# Patient Record
Sex: FEMALE | Race: BLACK OR AFRICAN AMERICAN | NOT HISPANIC OR LATINO | Employment: PART TIME | URBAN - METROPOLITAN AREA
[De-identification: names, ages, dates, MRNs, and addresses within clinical notes are randomized per-mention and may not be internally consistent; named-entity substitution may affect disease eponyms.]

---

## 2023-03-14 ENCOUNTER — TELEPHONE (OUTPATIENT)
Dept: ORTHOPEDICS | Facility: CLINIC | Age: 63
End: 2023-03-14
Payer: MEDICAID

## 2023-03-14 NOTE — TELEPHONE ENCOUNTER
----- Message from Mercy Jacobs MA sent at 3/14/2023 11:02 AM CDT -----  Type:  Sooner Apoointment Request    Caller is requesting a sooner appointment. yes   Name of Caller: pt  When is the first available appointment? yes  Would the patient rather a call back or a response via [facility]? Call back  Best Call Back Number: [phone]  Additional Information:  please contact pt to be scheduled by dept from active referral, pt requesting [place] location    NO ANSWER  RECORDER SAID THE WIRELESS PERSON IS NOT AVAILABLE  PLEASE CALL AGAIN LATER

## 2023-06-05 DIAGNOSIS — M25.561 PAIN IN BOTH KNEES, UNSPECIFIED CHRONICITY: Primary | ICD-10-CM

## 2023-06-05 DIAGNOSIS — M25.562 PAIN IN BOTH KNEES, UNSPECIFIED CHRONICITY: Primary | ICD-10-CM

## 2023-06-05 NOTE — PROGRESS NOTES
"Subjective:      Patient ID: Enmanuel Dickson is a 62 y.o. female.    Chief Complaint: Pain of the Right Knee (Patient presents today as a new patient stating she has been having pain in her knees for about 2 years now. )      HPI  ([facility])    History of right TKA 2022 by Dr. Snyder. 8-9 years ago she had IM cinthia of right tibia for fracture. She has no pain in right knee. He no longer takes her insurance (medicaid).     She has intermittent pain in left knee that is worse with standing and walking. She works in chemical plant. She rates her pain as a 0 on a scale of 1-10. She has intermittent swelling in left knee with some giving way.     She takes mobic and prn aspercream.     No relief with previous steroid injections in left knee. Had good relief with previous gel injections.     She is not ready for left TKA.       History reviewed. No pertinent past medical history.      Current Outpatient Medications:     acetaminophen (TYLENOL) 500 MG tablet, Take 2 tablets by mouth every 8 (eight) hours as needed., Disp: , Rfl:     biotin 1 mg tablet, Take 1,000 mcg by mouth., Disp: , Rfl:     gabapentin (NEURONTIN) 300 MG capsule, Take 300 mg by mouth 3 (three) times daily., Disp: , Rfl:     losartan-hydrochlorothiazide 50-12.5 mg (HYZAAR) 50-12.5 mg per tablet, Take 1 tablet by mouth every morning., Disp: , Rfl:     meloxicam (MOBIC) 15 MG tablet, Take 15 mg by mouth., Disp: , Rfl:     metoprolol succinate (TOPROL-XL) 50 MG 24 hr tablet, Take 50 mg by mouth., Disp: , Rfl:     Review of patient's allergies indicates:  No Known Allergies    Review of Systems   Constitutional: Negative for chills, fever, night sweats and weight gain.   Gastrointestinal:  Negative for bowel incontinence, nausea and vomiting.   Genitourinary:  Negative for bladder incontinence.   Neurological:  Negative for disturbances in coordination and loss of balance.         Objective:        Ht 5' 4" (1.626 m)   Wt 62.9 kg (138 lb 9.6 oz)   BMI 23.79 " kg/m²     General    Vitals reviewed.  Constitutional: She is oriented to person, place, and time. She appears well-developed and well-nourished.   Pulmonary/Chest: Effort normal.   Abdominal: She exhibits no distension.   Neurological: She is alert and oriented to person, place, and time.   Psychiatric: She has a normal mood and affect. Her behavior is normal. Judgment and thought content normal.         Body habitus is normal.   The patient walks without a limp.      RIGHT KNEE EXAM:  Incisions are well healed.   No tenderness noted.   Range of motion- Flexion 110 deg, Extension 0 deg,   No gross instability.     LE is NVI distally with supple calf.     LEFT KNEE EXAM:  Resisted SLR negative.   The skin over the knee is intact.  Knee effusion mild   Tendernes is located medial and lateral, varus deformity noted.   Range of motion- Flexion 110 deg, Extension 0 deg,     Ligament exam:   MCL 1+ laxity   Lachman intact              Post sag intact    LCL intact    Patellar apprehension negative.  Popliteal cyst negative  Patellar crepitation present.    Motor normal 5/5 strength in all tested muscle groups.   Sensory normal.      XRAY INTERPRETATION:  X-rays of bilateral knees dated 6/14/23 are personally reviewed and show severe tricompartmental arthritis in left knee with varus deformity. Right total knee prosthesis with IM cinthia right tibia.     Above XRs reviewed with Dr. العلي.         Assessment:       Encounter Diagnoses   Name Primary?    Primary osteoarthritis of left knee Yes    History of total right knee replacement           Plan:       Enmanuel was seen today for pain.    Diagnoses and all orders for this visit:    Primary osteoarthritis of left knee  -     Ambulatory referral/consult to Orthopedics    History of total right knee replacement      History of right TKA 2022 by Dr. Snyder. 8-9 years ago she had IM cinthia of right tibia for fracture. She has no pain in right knee.     She has intermittent pain  in left knee that is worse with standing and walking. She works in chemical plant. She has intermittent swelling in left knee with some giving way.     XRs of both knees show severe tricompartmental arthritis in left knee with varus deformity. Right total knee prosthesis with IM cinthia right tibia.    Treatment options reviewed with patient along with above bilateral knee xrays. Following plan made:     - Continue on mobic. Reviewed dosing and side effects. Take with food.   - Given hinged knee brace. Will wear this prn comfort/support with prolonged walking.   - No relief with previous steroid injections. She declines injection today.   - Good relief with previous gel injections. Will order euflexxa injection for right knee. 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 No previous improvement with medications, time, or steroid injections. Euflexxa orders done for injection to be after 6/28/23.   - She will follow up with one of the other ortho providers for her injections since I will be gone.   - She is a candidate for right TKA when she is ready. She would like to hold off as long as she has relief with euflexxa injections.     Follow up if symptoms worsen or fail to improve.

## 2023-06-14 ENCOUNTER — OFFICE VISIT (OUTPATIENT)
Dept: ORTHOPEDICS | Facility: CLINIC | Age: 63
End: 2023-06-14
Payer: MEDICAID

## 2023-06-14 VITALS — WEIGHT: 138.63 LBS | BODY MASS INDEX: 23.67 KG/M2 | HEIGHT: 64 IN

## 2023-06-14 DIAGNOSIS — M17.12 PRIMARY OSTEOARTHRITIS OF LEFT KNEE: Primary | ICD-10-CM

## 2023-06-14 DIAGNOSIS — Z96.651 HISTORY OF TOTAL RIGHT KNEE REPLACEMENT: ICD-10-CM

## 2023-06-14 PROCEDURE — 3008F PR BODY MASS INDEX (BMI) DOCUMENTED: ICD-10-PCS | Mod: CPTII,,, | Performed by: PHYSICIAN ASSISTANT

## 2023-06-14 PROCEDURE — 99203 OFFICE O/P NEW LOW 30 MIN: CPT | Mod: S$PBB,,, | Performed by: PHYSICIAN ASSISTANT

## 2023-06-14 PROCEDURE — 99999 PR PBB SHADOW E&M-EST. PATIENT-LVL III: CPT | Mod: PBBFAC,,, | Performed by: PHYSICIAN ASSISTANT

## 2023-06-14 PROCEDURE — 99203 PR OFFICE/OUTPT VISIT, NEW, LEVL III, 30-44 MIN: ICD-10-PCS | Mod: S$PBB,,, | Performed by: PHYSICIAN ASSISTANT

## 2023-06-14 PROCEDURE — 3008F BODY MASS INDEX DOCD: CPT | Mod: CPTII,,, | Performed by: PHYSICIAN ASSISTANT

## 2023-06-14 PROCEDURE — 1160F RVW MEDS BY RX/DR IN RCRD: CPT | Mod: CPTII,,, | Performed by: PHYSICIAN ASSISTANT

## 2023-06-14 PROCEDURE — 1159F MED LIST DOCD IN RCRD: CPT | Mod: CPTII,,, | Performed by: PHYSICIAN ASSISTANT

## 2023-06-14 PROCEDURE — 1160F PR REVIEW ALL MEDS BY PRESCRIBER/CLIN PHARMACIST DOCUMENTED: ICD-10-PCS | Mod: CPTII,,, | Performed by: PHYSICIAN ASSISTANT

## 2023-06-14 PROCEDURE — 1159F PR MEDICATION LIST DOCUMENTED IN MEDICAL RECORD: ICD-10-PCS | Mod: CPTII,,, | Performed by: PHYSICIAN ASSISTANT

## 2023-06-14 PROCEDURE — 99213 OFFICE O/P EST LOW 20 MIN: CPT | Mod: PBBFAC,PN | Performed by: PHYSICIAN ASSISTANT

## 2023-06-14 PROCEDURE — 99999 PR PBB SHADOW E&M-EST. PATIENT-LVL III: ICD-10-PCS | Mod: PBBFAC,,, | Performed by: PHYSICIAN ASSISTANT

## 2023-06-14 RX ORDER — GABAPENTIN 300 MG/1
300 CAPSULE ORAL 3 TIMES DAILY
COMMUNITY
Start: 2023-05-31

## 2023-06-14 RX ORDER — MELOXICAM 15 MG/1
15 TABLET ORAL
COMMUNITY
Start: 2023-05-31

## 2023-06-14 RX ORDER — ACETAMINOPHEN 500 MG
2 TABLET ORAL EVERY 8 HOURS PRN
COMMUNITY

## 2023-06-14 RX ORDER — BIOTIN 1 MG
1000 TABLET ORAL
COMMUNITY

## 2023-06-14 RX ORDER — METOPROLOL SUCCINATE 50 MG/1
50 TABLET, EXTENDED RELEASE ORAL
COMMUNITY
Start: 2023-05-29

## 2023-06-14 RX ORDER — LOSARTAN POTASSIUM AND HYDROCHLOROTHIAZIDE 12.5; 5 MG/1; MG/1
1 TABLET ORAL EVERY MORNING
COMMUNITY
Start: 2023-05-18

## 2023-06-14 NOTE — PATIENT INSTRUCTIONS
It was nice to meet you today! I am sorry that you are hurting so much.     You have some wear and tear in your left knee (arthritis) and this is likely what is causing your pain. Right knee xrays look okay.     Wear the knee brace on left knee as needed for prolonged walking. This should give you added support and help with pain.     Continue meloxicam to help with pain/inflammation. Take as directed with food.     I will order gel injections (euflexxa) and we will work on getting approved. Will call you to schedule your appointment.     Please stay in touch and call me if you need anything. You can also send me a message in [facility].     Jacqui   [phone]

## 2023-06-23 ENCOUNTER — TELEPHONE (OUTPATIENT)
Dept: ORTHOPEDICS | Facility: CLINIC | Age: 63
End: 2023-06-23
Payer: MEDICAID

## 2023-06-23 NOTE — TELEPHONE ENCOUNTER
I don't have any availability before I leave next week.     The PAs coming in to help don't start until after the holiday.     She should keep her scheduled appointments.

## 2023-06-23 NOTE — TELEPHONE ENCOUNTER
Returned pt call. patient wanted an earlier appt with Mar but I informed her that her schedule is full her last days . She is scheduled to be seen on 7/5..

## 2023-06-23 NOTE — TELEPHONE ENCOUNTER
----- Message from Helen Hodge MA sent at 6/23/2023 10:20 AM CDT -----  Regarding: FW: sooner  Contact: [phone]    ----- Message -----  From: Catherine Escobedo  Sent: 6/23/2023   9:12 AM CDT  To: [facility] Staff  Subject: sooner                                           Type:  Sooner Apoointment Request    Caller is requesting a sooner appointment.  Caller declined first available appointment listed below.  Caller will not accept being placed on the waitlist and is requesting a message be sent to doctor.  Name of Caller: pt  When is the first available appointment? 7/5  Symptoms: knee injection  Would the patient rather a call back or a response via [facility]?  call  Best Call Back Number: [phone] or [phone]  Additional Information:

## 2023-07-05 ENCOUNTER — OFFICE VISIT (OUTPATIENT)
Dept: ORTHOPEDICS | Facility: CLINIC | Age: 63
End: 2023-07-05
Payer: MEDICAID

## 2023-07-05 VITALS — BODY MASS INDEX: 23.56 KG/M2 | HEIGHT: 64 IN | WEIGHT: 138 LBS

## 2023-07-05 DIAGNOSIS — M25.552 CHRONIC LEFT HIP PAIN: Primary | ICD-10-CM

## 2023-07-05 DIAGNOSIS — G89.29 CHRONIC LEFT HIP PAIN: Primary | ICD-10-CM

## 2023-07-05 PROCEDURE — 99999 PR PBB SHADOW E&M-EST. PATIENT-LVL III: CPT | Mod: PBBFAC,,, | Performed by: ORTHOPAEDIC SURGERY

## 2023-07-05 PROCEDURE — 99212 PR OFFICE/OUTPT VISIT, EST, LEVL II, 10-19 MIN: ICD-10-PCS | Mod: S$PBB,25,, | Performed by: ORTHOPAEDIC SURGERY

## 2023-07-05 PROCEDURE — 3008F PR BODY MASS INDEX (BMI) DOCUMENTED: ICD-10-PCS | Mod: CPTII,,, | Performed by: ORTHOPAEDIC SURGERY

## 2023-07-05 PROCEDURE — 20610 LARGE JOINT ASPIRATION/INJECTION: L KNEE: ICD-10-PCS | Mod: S$PBB,LT,, | Performed by: ORTHOPAEDIC SURGERY

## 2023-07-05 PROCEDURE — 99212 OFFICE O/P EST SF 10 MIN: CPT | Mod: S$PBB,25,, | Performed by: ORTHOPAEDIC SURGERY

## 2023-07-05 PROCEDURE — 20610 DRAIN/INJ JOINT/BURSA W/O US: CPT | Mod: S$PBB,LT,, | Performed by: ORTHOPAEDIC SURGERY

## 2023-07-05 PROCEDURE — 1159F MED LIST DOCD IN RCRD: CPT | Mod: CPTII,,, | Performed by: ORTHOPAEDIC SURGERY

## 2023-07-05 PROCEDURE — 99999 PR PBB SHADOW E&M-EST. PATIENT-LVL III: ICD-10-PCS | Mod: PBBFAC,,, | Performed by: ORTHOPAEDIC SURGERY

## 2023-07-05 PROCEDURE — 99213 OFFICE O/P EST LOW 20 MIN: CPT | Mod: PBBFAC,PN,25 | Performed by: ORTHOPAEDIC SURGERY

## 2023-07-05 PROCEDURE — 20610 DRAIN/INJ JOINT/BURSA W/O US: CPT | Mod: PBBFAC,PN | Performed by: ORTHOPAEDIC SURGERY

## 2023-07-05 PROCEDURE — 3008F BODY MASS INDEX DOCD: CPT | Mod: CPTII,,, | Performed by: ORTHOPAEDIC SURGERY

## 2023-07-05 PROCEDURE — 1159F PR MEDICATION LIST DOCUMENTED IN MEDICAL RECORD: ICD-10-PCS | Mod: CPTII,,, | Performed by: ORTHOPAEDIC SURGERY

## 2023-07-05 RX ADMIN — Medication 20 MG: at 09:07

## 2023-07-05 NOTE — PROCEDURES
Large Joint Aspiration/Injection: L knee    Date/Time: 7/5/2023 9:30 AM  Performed by: Nona Marcos PA-C  Authorized by: Nona Marcos PA-C     Indications:  Arthritis and pain  Site marked: the procedure site was marked    Timeout: prior to procedure the correct patient, procedure, and site was verified    Prep: patient was prepped and draped in usual sterile fashion      Details:  Needle Size:  22 G  Location:  Knee  Site:  L knee  Medications:  20 mg sodium hyaluronate (EUFLEXXA) 10 mg/mL(mw 2.4 -3.6 million)  Patient tolerance:  Patient tolerated the procedure well with no immediate complications

## 2023-07-06 ENCOUNTER — TELEPHONE (OUTPATIENT)
Dept: ORTHOPEDICS | Facility: CLINIC | Age: 63
End: 2023-07-06
Payer: MEDICAID

## 2023-07-06 NOTE — TELEPHONE ENCOUNTER
----- Message from Jared Nicholson sent at 7/6/2023  9:26 AM CDT -----  .Type:  Needs Medical Advice    Who Called: pt    Would the patient rather a call back or a response via [facility]? Call back  Best Call Back Number: [phone]  Additional Information:     Pt would like a call back please

## 2023-07-11 NOTE — PROGRESS NOTES
Enmanuel Dickson presents to clinic today for the second left knee Euflexxa injection.    Exam demonstrates no effusion in the  left knee, and the skin is intact.    GEN: Well developed, well nourished female. AAOX3. No acute distress.   Breathing unlabored.  Mood and affect appropiate.     Diagnosis: Osteoarthritis knee    Patient identified the planned injection site, the  left  knee was sterilly prepped in the standard fashion.  A 22-gauge needle was introduced into left knee joint from an indigo-lateral site without complication and knee was injected with 2 ml of Euflexxa.  Sterile dressing was applied.  The patient was instructed to rest apply ice and use OTC analgesics as needed. Patient should resume activities as tolerated and to call with any problems.     We will see Enmanuel Dickson back next week for the third injection.     Unrelated, she notes a multi month history of left lateral hip pain.  She reports pain when sleeping on her side at night.  This does not appear ambulation.  X-rays were performed at our last visit which so mild joint space loss in the left hip and mild degenerative changes of her lumbar spine.    There is local tenderness over the greater trochanter.  Range of motion of the hip is full and stable.  Left hip ip is not painful with rotation.    I recommended informed corticosteroid injection of the left trochanteric complication.  Patient tolerated well.

## 2023-07-12 ENCOUNTER — OFFICE VISIT (OUTPATIENT)
Dept: ORTHOPEDICS | Facility: CLINIC | Age: 63
End: 2023-07-12
Payer: MEDICAID

## 2023-07-12 VITALS — BODY MASS INDEX: 23.82 KG/M2 | WEIGHT: 139.5 LBS | HEIGHT: 64 IN

## 2023-07-12 DIAGNOSIS — M70.62 GREATER TROCHANTERIC BURSITIS OF LEFT HIP: ICD-10-CM

## 2023-07-12 DIAGNOSIS — M17.10 ARTHRITIS OF KNEE: Primary | ICD-10-CM

## 2023-07-12 PROCEDURE — 1159F MED LIST DOCD IN RCRD: CPT | Mod: CPTII,,, | Performed by: ORTHOPAEDIC SURGERY

## 2023-07-12 PROCEDURE — 20610 LARGE JOINT ASPIRATION/INJECTION: L KNEE: ICD-10-PCS | Mod: S$PBB,LT,, | Performed by: ORTHOPAEDIC SURGERY

## 2023-07-12 PROCEDURE — 99999 PR PBB SHADOW E&M-EST. PATIENT-LVL III: ICD-10-PCS | Mod: PBBFAC,,, | Performed by: ORTHOPAEDIC SURGERY

## 2023-07-12 PROCEDURE — 20610 DRAIN/INJ JOINT/BURSA W/O US: CPT | Mod: PBBFAC,PN | Performed by: ORTHOPAEDIC SURGERY

## 2023-07-12 PROCEDURE — 99213 OFFICE O/P EST LOW 20 MIN: CPT | Mod: PBBFAC,PN | Performed by: ORTHOPAEDIC SURGERY

## 2023-07-12 PROCEDURE — 99212 OFFICE O/P EST SF 10 MIN: CPT | Mod: S$PBB,25,, | Performed by: ORTHOPAEDIC SURGERY

## 2023-07-12 PROCEDURE — 20610 DRAIN/INJ JOINT/BURSA W/O US: CPT | Mod: S$PBB,LT,, | Performed by: ORTHOPAEDIC SURGERY

## 2023-07-12 PROCEDURE — 3008F PR BODY MASS INDEX (BMI) DOCUMENTED: ICD-10-PCS | Mod: CPTII,,, | Performed by: ORTHOPAEDIC SURGERY

## 2023-07-12 PROCEDURE — 99212 PR OFFICE/OUTPT VISIT, EST, LEVL II, 10-19 MIN: ICD-10-PCS | Mod: S$PBB,25,, | Performed by: ORTHOPAEDIC SURGERY

## 2023-07-12 PROCEDURE — 3008F BODY MASS INDEX DOCD: CPT | Mod: CPTII,,, | Performed by: ORTHOPAEDIC SURGERY

## 2023-07-12 PROCEDURE — 99999 PR PBB SHADOW E&M-EST. PATIENT-LVL III: CPT | Mod: PBBFAC,,, | Performed by: ORTHOPAEDIC SURGERY

## 2023-07-12 PROCEDURE — 1159F PR MEDICATION LIST DOCUMENTED IN MEDICAL RECORD: ICD-10-PCS | Mod: CPTII,,, | Performed by: ORTHOPAEDIC SURGERY

## 2023-07-12 RX ORDER — TRIAMCINOLONE ACETONIDE 40 MG/ML
40 INJECTION, SUSPENSION INTRA-ARTICULAR; INTRAMUSCULAR
Status: DISCONTINUED | OUTPATIENT
Start: 2023-07-12 | End: 2023-07-12 | Stop reason: HOSPADM

## 2023-07-12 RX ORDER — LIDOCAINE HYDROCHLORIDE 10 MG/ML
2 INJECTION INFILTRATION; PERINEURAL
Status: DISCONTINUED | OUTPATIENT
Start: 2023-07-12 | End: 2023-07-12 | Stop reason: HOSPADM

## 2023-07-12 RX ADMIN — TRIAMCINOLONE ACETONIDE 40 MG: 40 INJECTION, SUSPENSION INTRA-ARTICULAR; INTRAMUSCULAR at 09:07

## 2023-07-12 RX ADMIN — Medication 20 MG: at 09:07

## 2023-07-12 RX ADMIN — LIDOCAINE HYDROCHLORIDE 2 ML: 10 INJECTION, SOLUTION INFILTRATION; PERINEURAL at 09:07

## 2023-07-12 NOTE — PROCEDURES
Large Joint Aspiration/Injection: L knee    Date/Time: 7/12/2023 9:30 AM  Performed by: Nona Marcos PA-C  Authorized by: Nona Marcos PA-C     Consent Done?:  Yes (Verbal)  Indications:  Arthritis and pain  Site marked: the procedure site was marked    Timeout: prior to procedure the correct patient, procedure, and site was verified    Prep: patient was prepped and draped in usual sterile fashion      Details:  Needle Size:  21 G  Location:  Knee  Site:  L knee  Medications:  20 mg euflexxa  Patient tolerance:  Patient tolerated the procedure well with no immediate complications  Large Joint Aspiration/Injection: L greater trochanteric bursa    Date/Time: 7/12/2023 9:30 AM  Performed by: Nona Marcos PA-C  Authorized by: Nona Marcos PA-C     Consent Done?:  Yes (Verbal)  Indications:  Arthritis and pain  Site marked: the procedure site was marked    Timeout: prior to procedure the correct patient, procedure, and site was verified    Prep: patient was prepped and draped in usual sterile fashion      Details:  Needle Size:  21 G  Location:  Hip  Site:  L greater trochanteric bursa  Medications:  2 mL LIDOcaine HCL 10 mg/ml (1%) 10 mg/mL (1 %); 40 mg triamcinolone acetonide 40 mg/mL  Patient tolerance:  Patient tolerated the procedure well with no immediate complications

## 2023-07-17 NOTE — PROGRESS NOTES
Enmanuel Dickson presents to clinic today for the third left knee Euflexxa injection.    Exam demonstrates no effusion in the  left knee, and the skin is intact.    GEN: Well developed, well nourished male. AAOX3. No acute distress.   Breathing unlabored.  Mood and affect appropriate.     Diagnosis: Osteoarthritis knee    Patient identified the planned injection site, the  left  knee was sterilly prepped in the standard fashion.  A 22-gauge needle was introduced into left knee joint from an indigo-lateral site without complication and knee was injected with 2 ml of Euflexxa.  Sterile dressing was applied.  The patient was instructed to rest apply ice and use OTC analgesics as needed. Patient should resume activities as tolerated and to call with any problems.     We will see Enmanuel Dickson back for follow-up in 3 months. She johnny consider TKA at that time    At her last visit, she was also treated with CSI of the left trochanteric bursa. She reports injection was very helpful. She is able to side sleep now.

## 2023-07-19 ENCOUNTER — OFFICE VISIT (OUTPATIENT)
Dept: ORTHOPEDICS | Facility: CLINIC | Age: 63
End: 2023-07-19
Payer: MEDICAID

## 2023-07-19 VITALS — HEIGHT: 64 IN | WEIGHT: 136.81 LBS | BODY MASS INDEX: 23.36 KG/M2

## 2023-07-19 DIAGNOSIS — M17.12 PRIMARY OSTEOARTHRITIS OF LEFT KNEE: Primary | ICD-10-CM

## 2023-07-19 PROCEDURE — 99999 PR PBB SHADOW E&M-EST. PATIENT-LVL III: ICD-10-PCS | Mod: PBBFAC,,, | Performed by: ORTHOPAEDIC SURGERY

## 2023-07-19 PROCEDURE — 99499 NO LOS: ICD-10-PCS | Mod: S$PBB,,, | Performed by: ORTHOPAEDIC SURGERY

## 2023-07-19 PROCEDURE — 99213 OFFICE O/P EST LOW 20 MIN: CPT | Mod: PBBFAC,PN | Performed by: ORTHOPAEDIC SURGERY

## 2023-07-19 PROCEDURE — 20610 DRAIN/INJ JOINT/BURSA W/O US: CPT | Mod: PBBFAC,PN | Performed by: ORTHOPAEDIC SURGERY

## 2023-07-19 PROCEDURE — 99499 UNLISTED E&M SERVICE: CPT | Mod: S$PBB,,, | Performed by: ORTHOPAEDIC SURGERY

## 2023-07-19 PROCEDURE — 20610 DRAIN/INJ JOINT/BURSA W/O US: CPT | Mod: S$PBB,LT,, | Performed by: ORTHOPAEDIC SURGERY

## 2023-07-19 PROCEDURE — 99999 PR PBB SHADOW E&M-EST. PATIENT-LVL III: CPT | Mod: PBBFAC,,, | Performed by: ORTHOPAEDIC SURGERY

## 2023-07-19 PROCEDURE — 20610 LARGE JOINT ASPIRATION/INJECTION: L KNEE: ICD-10-PCS | Mod: S$PBB,LT,, | Performed by: ORTHOPAEDIC SURGERY

## 2023-07-19 RX ADMIN — Medication 20 MG: at 10:07

## 2023-07-19 NOTE — PROCEDURES
Large Joint Aspiration/Injection: L knee    Date/Time: 7/19/2023 10:00 AM  Performed by: Nona Marcos PA-C  Authorized by: Nona Marcos PA-C     Indications:  Arthritis and pain  Site marked: the procedure site was marked    Timeout: prior to procedure the correct patient, procedure, and site was verified    Prep: patient was prepped and draped in usual sterile fashion    Location:  Knee  Site:  L knee  Medications:  20 mg sodium hyaluronate (EUFLEXXA) 10 mg/mL(mw 2.4 -3.6 million)  Patient tolerance:  Patient tolerated the procedure well with no immediate complications

## 2023-09-06 NOTE — PROGRESS NOTES
Enmanuel Dickson presents to clinic today for the first left knee Euflexxa injection.    Exam demonstrates no effusion in the left knee, and the skin is intact.    GEN: Well developed, well nourished female. AAOX3. No acute distress.   Breathing unlabored.  Mood and affect appropriate.     Diagnosis: Osteoarthritis knee    Patient identified the planned injection site, the  left  knee was sterilly prepped in the standard fashion.  A 22-gauge needle was introduced into left knee joint from an indigo-lateral site without complication and knee was injected with 2 ml of Euflexxa.  Sterile dressing was applied.  The patient was instructed to rest apply ice and use OTC analgesics as needed. Patient should resume activities as tolerated and to call with any problems.     We will see Enmanuel Dickson back next week for the second injection.     General

## 2023-10-17 ENCOUNTER — OFFICE VISIT (OUTPATIENT)
Dept: ORTHOPEDICS | Facility: CLINIC | Age: 63
End: 2023-10-17
Payer: MEDICAID

## 2023-10-17 VITALS — HEIGHT: 64 IN | BODY MASS INDEX: 23.48 KG/M2

## 2023-10-17 DIAGNOSIS — M70.62 GREATER TROCHANTERIC BURSITIS OF LEFT HIP: ICD-10-CM

## 2023-10-17 DIAGNOSIS — M17.12 PRIMARY OSTEOARTHRITIS OF LEFT KNEE: Primary | ICD-10-CM

## 2023-10-17 PROCEDURE — 20610 LARGE JOINT ASPIRATION/INJECTION: L HIP JOINT: ICD-10-PCS | Mod: S$PBB,LT,, | Performed by: ORTHOPAEDIC SURGERY

## 2023-10-17 PROCEDURE — 3008F PR BODY MASS INDEX (BMI) DOCUMENTED: ICD-10-PCS | Mod: CPTII,,, | Performed by: ORTHOPAEDIC SURGERY

## 2023-10-17 PROCEDURE — 1160F PR REVIEW ALL MEDS BY PRESCRIBER/CLIN PHARMACIST DOCUMENTED: ICD-10-PCS | Mod: CPTII,,, | Performed by: ORTHOPAEDIC SURGERY

## 2023-10-17 PROCEDURE — 3008F BODY MASS INDEX DOCD: CPT | Mod: CPTII,,, | Performed by: ORTHOPAEDIC SURGERY

## 2023-10-17 PROCEDURE — 99999 PR PBB SHADOW E&M-EST. PATIENT-LVL II: ICD-10-PCS | Mod: PBBFAC,,, | Performed by: ORTHOPAEDIC SURGERY

## 2023-10-17 PROCEDURE — 99999PBSHW PR PBB SHADOW TECHNICAL ONLY FILED TO HB: Mod: PBBFAC,,,

## 2023-10-17 PROCEDURE — 20610 DRAIN/INJ JOINT/BURSA W/O US: CPT | Mod: PBBFAC,PN,LT | Performed by: ORTHOPAEDIC SURGERY

## 2023-10-17 PROCEDURE — 99214 OFFICE O/P EST MOD 30 MIN: CPT | Mod: S$PBB,25,, | Performed by: ORTHOPAEDIC SURGERY

## 2023-10-17 PROCEDURE — 1159F PR MEDICATION LIST DOCUMENTED IN MEDICAL RECORD: ICD-10-PCS | Mod: CPTII,,, | Performed by: ORTHOPAEDIC SURGERY

## 2023-10-17 PROCEDURE — 99999PBSHW PR PBB SHADOW TECHNICAL ONLY FILED TO HB: ICD-10-PCS | Mod: PBBFAC,,,

## 2023-10-17 PROCEDURE — 1160F RVW MEDS BY RX/DR IN RCRD: CPT | Mod: CPTII,,, | Performed by: ORTHOPAEDIC SURGERY

## 2023-10-17 PROCEDURE — 99214 PR OFFICE/OUTPT VISIT, EST, LEVL IV, 30-39 MIN: ICD-10-PCS | Mod: S$PBB,25,, | Performed by: ORTHOPAEDIC SURGERY

## 2023-10-17 PROCEDURE — 1159F MED LIST DOCD IN RCRD: CPT | Mod: CPTII,,, | Performed by: ORTHOPAEDIC SURGERY

## 2023-10-17 PROCEDURE — 99212 OFFICE O/P EST SF 10 MIN: CPT | Mod: PBBFAC,PN | Performed by: ORTHOPAEDIC SURGERY

## 2023-10-17 PROCEDURE — 99999 PR PBB SHADOW E&M-EST. PATIENT-LVL II: CPT | Mod: PBBFAC,,, | Performed by: ORTHOPAEDIC SURGERY

## 2023-10-17 RX ORDER — TRIAMCINOLONE ACETONIDE 40 MG/ML
40 INJECTION, SUSPENSION INTRA-ARTICULAR; INTRAMUSCULAR
Status: DISCONTINUED | OUTPATIENT
Start: 2023-10-17 | End: 2023-10-17 | Stop reason: HOSPADM

## 2023-10-17 RX ADMIN — TRIAMCINOLONE ACETONIDE 40 MG: 40 INJECTION, SUSPENSION INTRA-ARTICULAR; INTRAMUSCULAR at 11:10

## 2023-10-17 NOTE — PROCEDURES
Large Joint Aspiration/Injection: L hip joint    Date/Time: 10/17/2023 11:00 AM    Performed by: Zach العلي MD  Authorized by: Zach العلي MD    Location:  Hip  Site:  L hip joint  Medications:  40 mg triamcinolone acetonide 40 mg/mL     After obtaining verbal informed consent the patient's left greater trochanteric bursa was aseptically injected with 40 mg of triamcinolone and 1 cc of 1% plain Xylocaine.  Appropriate management of postinjection flare was explained.

## 2023-10-17 NOTE — PROGRESS NOTES
Subjective:      Patient ID: Enmanuel Dickson is a 63 y.o. female.    Chief Complaint: Pain of the Left Knee    HPI    Follow-up for osteoarthritis.  The patient reports that her viscosupplementation is working well and she would like to repeated in the future when necessary.  She also has chronic left trochanteric bursitis that she would like injection.          Review of Systems   Constitutional: Negative for fever and weight loss.   HENT:  Negative for congestion.    Eyes:  Negative for visual disturbance.   Cardiovascular:  Negative for chest pain.   Respiratory:  Negative for shortness of breath.    Hematologic/Lymphatic: Negative for bleeding problem. Does not bruise/bleed easily.   Skin:  Negative for poor wound healing.   Musculoskeletal:  Positive for joint pain.   Gastrointestinal:  Negative for abdominal pain.   Genitourinary:  Negative for dysuria.   Neurological:  Negative for seizures.   Psychiatric/Behavioral:  Negative for altered mental status.    Allergic/Immunologic: Negative for persistent infections.         Objective:      Ortho/SPM Exam      Left hip    The patient is not in acute distress.   Body habitus is:normal.   Sclerae normal  The patient walks without a limp.   Respiratory distress:  none  The skin over the hip is:intact.   There is mild lateral tenderness  Range of motion- Flexion full, External rotation full, internal rotation full.  Resisted SLR negative.  Pain with rotation negative  Sciatic tension findings negative.  Shortening/lengthening compared to the contralateral side absent.  Pulses DP present, PT present.  Motor normal 5/5 strength in all tested muscle groups.   Sensory normal.    Left knee  1+ effusion  Range of motion 0-120  2+ valgus laxity  Crepitation is present    I reviewed the relevant radiographic images for the patient's condition:  Left knee radiographs show complete loss of medial joint space with osteophytes and subluxation, Kellgren stage IV             Assessment:       Encounter Diagnoses   Name Primary?    Primary osteoarthritis of left knee Yes    Greater trochanteric bursitis of left hip         The degeneration of the left knee is structurally advanced.  Gradual progression is likely.      Periodic injections are reasonable for the left hip          Plan:       Enmanuel was seen today for pain.    Diagnoses and all orders for this visit:    Primary osteoarthritis of left knee    Greater trochanteric bursitis of left hip          I explained my assessment    Left hip injection requested and consent was given     I explained the role of left knee replacement in the treatment of this condition.  I reviewed the nature of the operation, the expected clinical course and recovery period.  I also discussed the typical complications that are associated with the procedure.  The patient indicates that they would probably like repeat viscosupplementation before proceeding with arthroplasty

## 2024-01-23 ENCOUNTER — OFFICE VISIT (OUTPATIENT)
Dept: ORTHOPEDICS | Facility: CLINIC | Age: 64
End: 2024-01-23
Payer: MEDICAID

## 2024-01-23 DIAGNOSIS — M17.12 PRIMARY OSTEOARTHRITIS OF LEFT KNEE: Primary | ICD-10-CM

## 2024-01-23 DIAGNOSIS — M70.62 GREATER TROCHANTERIC BURSITIS OF LEFT HIP: ICD-10-CM

## 2024-01-23 PROCEDURE — 1159F MED LIST DOCD IN RCRD: CPT | Mod: CPTII,,, | Performed by: ORTHOPAEDIC SURGERY

## 2024-01-23 PROCEDURE — 99213 OFFICE O/P EST LOW 20 MIN: CPT | Mod: S$PBB,25,, | Performed by: ORTHOPAEDIC SURGERY

## 2024-01-23 PROCEDURE — 99212 OFFICE O/P EST SF 10 MIN: CPT | Mod: PBBFAC,PN | Performed by: ORTHOPAEDIC SURGERY

## 2024-01-23 PROCEDURE — 99999 PR PBB SHADOW E&M-EST. PATIENT-LVL II: CPT | Mod: PBBFAC,,, | Performed by: ORTHOPAEDIC SURGERY

## 2024-01-23 PROCEDURE — 20610 DRAIN/INJ JOINT/BURSA W/O US: CPT | Mod: PBBFAC,PN,LT | Performed by: ORTHOPAEDIC SURGERY

## 2024-01-23 PROCEDURE — 1160F RVW MEDS BY RX/DR IN RCRD: CPT | Mod: CPTII,,, | Performed by: ORTHOPAEDIC SURGERY

## 2024-01-23 PROCEDURE — 99999PBSHW PR PBB SHADOW TECHNICAL ONLY FILED TO HB: Mod: PBBFAC,,,

## 2024-01-23 RX ORDER — TRIAMCINOLONE ACETONIDE 40 MG/ML
40 INJECTION, SUSPENSION INTRA-ARTICULAR; INTRAMUSCULAR
Status: DISCONTINUED | OUTPATIENT
Start: 2024-01-23 | End: 2024-01-23 | Stop reason: HOSPADM

## 2024-01-23 RX ADMIN — TRIAMCINOLONE ACETONIDE 40 MG: 40 INJECTION, SUSPENSION INTRA-ARTICULAR; INTRAMUSCULAR at 10:01

## 2024-01-23 NOTE — PROGRESS NOTES
Subjective:      Patient ID: Enmanuel Dickson is a 63 y.o. female.    Chief Complaint: Pain of the Left Knee    HPI    Follow-up for osteoarthritis of the left knee and chronic trochanteric bursitis of the left hip.  The patient reports injections helped both of these sites.  She requests hip injection today and would like to repeat viscosupplementation for her left knee.          Review of Systems   Constitutional: Negative for fever and weight loss.   HENT:  Negative for congestion.    Eyes:  Negative for visual disturbance.   Cardiovascular:  Negative for chest pain.   Respiratory:  Negative for shortness of breath.    Hematologic/Lymphatic: Negative for bleeding problem. Does not bruise/bleed easily.   Skin:  Negative for poor wound healing.   Musculoskeletal:  Positive for joint pain.   Gastrointestinal:  Negative for abdominal pain.   Genitourinary:  Negative for dysuria.   Neurological:  Negative for seizures.   Psychiatric/Behavioral:  Negative for altered mental status.    Allergic/Immunologic: Negative for persistent infections.         Objective:      Ortho/SPM Exam      Left hip    The patient is not in acute distress.   Body habitus is:normal.   Sclerae normal  The patient walks without a limp.   Respiratory distress:  none  The skin over the hip is:intact.   There is mild lateral tenderness  Range of motion- Flexion full, External rotation full, internal rotation full.  Resisted SLR negative.  Pain with rotation negative  Sciatic tension findings negative.  Shortening/lengthening compared to the contralateral side absent.  Pulses DP present, PT present.  Motor normal 5/5 strength in all tested muscle groups.   Sensory normal.    Left knee  1+ effusion  Range of motion 0-120  2+ valgus laxity  Crepitation is present              Assessment:       Encounter Diagnoses   Name Primary?    Primary osteoarthritis of left knee Yes    Greater trochanteric bursitis of left hip         Left hip condition is  chronic and well controlled with periodic injection.  As long as this is the case, these can be repeated.    The left knee has advanced objective findings.  Viscosupplementation has been helpful in the past, so it is reasonable to repeat this.  Structural progression of this condition is likely and may require other intervention in the future          Plan:       Enmanuel was seen today for pain.    Diagnoses and all orders for this visit:    Primary osteoarthritis of left knee    Greater trochanteric bursitis of left hip          I explained my diagnostic impression and the reasoning behind it in detail, using layman's terms.  Models and/or pictures were used to help in the explanation.    Consent given for left hip injection    Viscosupplementation is ordered for the left knee.  I explained the process and the patient agreed.    The patient should follow-up with me 3 months after the left knee injections are completed

## 2024-01-23 NOTE — PROCEDURES
Large Joint Aspiration/Injection: L hip joint    Date/Time: 1/23/2024 10:00 AM    Performed by: Zach العلي MD  Authorized by: Zach العلي MD    Location:  Hip  Site:  L hip joint  Medications:  40 mg triamcinolone acetonide 40 mg/mL     After obtaining verbal informed consent the patient's left greater trochanteric bursa was aseptically injected with 40 mg of triamcinolone and 1 cc of 1% plain Xylocaine.  Appropriate management of postinjection flare was explained.

## 2024-02-19 NOTE — PROGRESS NOTES
OFFICE VISIT FOR VISCOSUPPLEMENT INJECTIONS:    Enmanuel Dickson presents to clinic today for the first left knee Euflexxa injection.    She has received good relief with these injections in the past.     PHYSICAL EXAM:  General: Alert & oriented x3, well-developed and well-nourished, in no acute distress, sitting comfortably in the exam room.  Skin: Warm and dry. Capillary refill less than 2 seconds.   Head: Normocephalic and atraumatic.   Eyes: Sclera appear normal.   Nose: No deformities seen.   Ears: No deformities seen.   Neck: No tracheal deviation present.   Pulmonary/Chest: Breathing unlabored.   Neurological: Alert and oriented to person, place, and time.   Psychiatric: Mood is pleasant and affect appropriate.     Left Knee: Skin is intact. No effusion. No signs of infection.      Diagnosis: Osteoarthritis left knee      Injection Procedure Note   Prior to procedure the correct patient, procedure, and site was verified. Allergies were reviewed and verbal consent was obtained. The procedure site was marked.    After time out was performed, the patient was prepped aseptically with chloraprep, the area was sprayed with local topical anesthetic, and then cleaned with alcohol. A therapeutic injection of 20mg/2mL of Euflexxa was given under sterile technique using a 22-gauge x 1.5 needle from the anterolateral aspect of the left knee joint. Sterile dressing applied.    She had no adverse reactions to the medication. She was reminded to call the clinic immediately for any adverse side effects as explained in clinic today.       1. Apply ice compress to the affected area 2-3x a day for 15-20 minutes as needed for pain management.  2. Continue OTC Tylenol and/or NSAIDs as needed.  3. Advised to avoid strenuous activities for the next 24-48 hours. After 48 hours, resume activities as tolerated.     Follow-Up: 1 week with Nallely Bravo PA-C for 2nd injection.        All of the patient's questions were answered and  the patient will contact us if they have any questions or concerns in the interim.      Nallely Bravo PA-C  [facility]  Orthopedic Surgery

## 2024-02-22 ENCOUNTER — OFFICE VISIT (OUTPATIENT)
Dept: ORTHOPEDICS | Facility: CLINIC | Age: 64
End: 2024-02-22
Payer: MEDICAID

## 2024-02-22 DIAGNOSIS — M17.12 PRIMARY OSTEOARTHRITIS OF LEFT KNEE: Primary | ICD-10-CM

## 2024-02-22 PROCEDURE — 99499 UNLISTED E&M SERVICE: CPT | Mod: S$PBB,,,

## 2024-02-22 PROCEDURE — 20610 DRAIN/INJ JOINT/BURSA W/O US: CPT | Mod: S$PBB,LT,,

## 2024-02-22 PROCEDURE — 99212 OFFICE O/P EST SF 10 MIN: CPT | Mod: PBBFAC,PN,25

## 2024-02-22 PROCEDURE — 20610 DRAIN/INJ JOINT/BURSA W/O US: CPT | Mod: PBBFAC,PN

## 2024-02-22 PROCEDURE — 99999 PR PBB SHADOW E&M-EST. PATIENT-LVL II: CPT | Mod: PBBFAC,,,

## 2024-02-22 PROCEDURE — 99999PBSHW PR PBB SHADOW TECHNICAL ONLY FILED TO HB: Mod: JZ,PBBFAC,,

## 2024-02-22 RX ADMIN — Medication 20 MG: at 10:02

## 2024-02-22 NOTE — PROCEDURES
Large Joint Aspiration/Injection: L knee    Date/Time: 2/22/2024 10:30 AM    Performed by: Nallely Bravo PA-C  Authorized by: Nallely Bravo PA-C    Consent Done?:  Yes (Verbal)  Indications:  Arthritis and pain  Site marked: the procedure site was marked    Timeout: prior to procedure the correct patient, procedure, and site was verified    Local anesthesia used?: No      Details:  Needle Size:  22 G  Ultrasonic Guidance for needle placement?: No    Approach:  Anterolateral  Location:  Knee  Site:  L knee  Medications:  20 mg sodium hyaluronate (EUFLEXXA) 10 mg/mL(mw 2.4 -3.6 million)  Patient tolerance:  Patient tolerated the procedure well with no immediate complications      Injection Procedure Note   Prior to procedure the correct patient, procedure, and site was verified. Allergies were reviewed and verbal consent was obtained. The procedure site was marked.    After time out was performed, the patient was prepped aseptically with chloraprep, the area was sprayed with local topical anesthetic, and then cleaned with alcohol. A therapeutic injection of 20mg/2mL of Euflexxa was given under sterile technique using a 22-gauge x 1.5 needle from the anterolateral aspect of the left knee joint. Sterile dressing applied.    She had no adverse reactions to the medication. She was reminded to call the clinic immediately for any adverse side effects as explained in clinic today.

## 2024-02-22 NOTE — LETTER
February 22, 2024      [facility] - Orthopedics  [address]  [address]  [place] [address]  Phone: [phone]  Fax: [phone]       Patient: Enmanuel Dickson   YOB: 1960  Date of Visit: 02/22/2024    To Whom It May Concern:    Annabelle Dickson  was at [facility] on 02/22/2024. The patient may return to work on 2/22/2024 with no restrictions. If you have any questions or concerns, or if I can be of further assistance, please do not hesitate to contact me.    Sincerely,    Felisha Gomez MA

## 2024-02-29 ENCOUNTER — OFFICE VISIT (OUTPATIENT)
Dept: ORTHOPEDICS | Facility: CLINIC | Age: 64
End: 2024-02-29
Payer: MEDICAID

## 2024-02-29 ENCOUNTER — TELEPHONE (OUTPATIENT)
Dept: ORTHOPEDICS | Facility: CLINIC | Age: 64
End: 2024-02-29

## 2024-02-29 DIAGNOSIS — M17.12 PRIMARY OSTEOARTHRITIS OF LEFT KNEE: Primary | ICD-10-CM

## 2024-02-29 PROCEDURE — 99999PBSHW PR PBB SHADOW TECHNICAL ONLY FILED TO HB: Mod: JZ,PBBFAC,,

## 2024-02-29 PROCEDURE — 99212 OFFICE O/P EST SF 10 MIN: CPT | Mod: PBBFAC,PN

## 2024-02-29 PROCEDURE — 20610 DRAIN/INJ JOINT/BURSA W/O US: CPT | Mod: S$PBB,LT,,

## 2024-02-29 PROCEDURE — 99499 UNLISTED E&M SERVICE: CPT | Mod: S$PBB,,,

## 2024-02-29 PROCEDURE — 99999 PR PBB SHADOW E&M-EST. PATIENT-LVL II: CPT | Mod: PBBFAC,,,

## 2024-02-29 PROCEDURE — 20610 DRAIN/INJ JOINT/BURSA W/O US: CPT | Mod: PBBFAC,PN,LT

## 2024-02-29 RX ADMIN — Medication 20 MG: at 10:02

## 2024-02-29 NOTE — LETTER
February 29, 2024      [facility] - Orthopedics  [address]  [address]  [place] [address]  Phone: [phone]  Fax: [phone]       Patient: Enmanuel Dickson   YOB: 1960  Date of Visit: 02/29/2024    To Whom It May Concern:    Annabelle Dickson  was at [facility] on 02/29/2024. The patient may return to work/school on 02/29/2024 with no restrictions. If you have any questions or concerns, or if I can be of further assistance, please do not hesitate to contact me.    Sincerely,        DEEPAK Rivera

## 2024-02-29 NOTE — PROCEDURES
Large Joint Aspiration/Injection: L knee    Date/Time: 2/29/2024 10:30 AM    Performed by: Nallely Bravo PA-C  Authorized by: Nallely Bravo PA-C    Consent Done?:  Yes (Verbal)  Indications:  Arthritis and pain  Site marked: the procedure site was marked    Timeout: prior to procedure the correct patient, procedure, and site was verified      Local anesthesia used?: Yes    Local anesthetic:  Topical anesthetic    Details:  Needle Size:  22 G  Ultrasonic Guidance for needle placement?: No    Approach:  Anterolateral  Location:  Knee  Site:  L knee  Medications:  20 mg sodium hyaluronate (EUFLEXXA) 10 mg/mL(mw 2.4 -3.6 million)  Patient tolerance:  Patient tolerated the procedure well with no immediate complications      Injection Procedure Note   Prior to procedure the correct patient, procedure, and site was verified. Allergies were reviewed and verbal consent was obtained. The procedure site was marked.    After time out was performed, the patient was prepped aseptically with chloraprep, the area was sprayed with local topical anesthetic, and then cleaned with alcohol. A therapeutic injection of 20mg/2mL of Euflexxa was given under sterile technique using a 22-gauge x 1.5 needle from the anterolateral aspect of the left knee joint. Sterile dressing applied.    Patient tolerated the procedure well. Pain decreased. She had no adverse reactions to the medication. She was reminded to call the clinic immediately for any adverse side effects as explained in clinic today.     Advised patient to avoid strenuous activities for the next 24-48 hours and to apply ice for 20 minutes to help alleviate this this pain.

## 2024-02-29 NOTE — PROGRESS NOTES
Subjective:      Patient ID: Enmanuel Dickson is a 63 y.o. female.    Chief Complaint: No chief complaint on file.      HPI: Enmanuel Dickson is a 63 y.o. female who presents to clinic for second left knee Euflexxa injection. Patient tolerated previous injection well.    Ambulating: unassisted    PAST MEDICAL HISTORY:    No past medical history on file.    PAST SURGICAL HISTORY:    No past surgical history on file.    FAMILY HISTORY:    No family history on file.    SOCIAL HISTORY:    Social History     Occupational History    Not on file   Tobacco Use    Smoking status: Never    Smokeless tobacco: Never   Substance and Sexual Activity    Alcohol use: Not on file    Drug use: Not on file    Sexual activity: Not on file      MEDICATIONS:   Current Outpatient Medications:     acetaminophen (TYLENOL) 500 MG tablet, Take 2 tablets by mouth every 8 (eight) hours as needed., Disp: , Rfl:     biotin 1 mg tablet, Take 1,000 mcg by mouth., Disp: , Rfl:     gabapentin (NEURONTIN) 300 MG capsule, Take 300 mg by mouth 3 (three) times daily., Disp: , Rfl:     losartan-hydrochlorothiazide 50-12.5 mg (HYZAAR) 50-12.5 mg per tablet, Take 1 tablet by mouth every morning., Disp: , Rfl:     meloxicam (MOBIC) 15 MG tablet, Take 15 mg by mouth., Disp: , Rfl:     metoprolol succinate (TOPROL-XL) 50 MG 24 hr tablet, Take 50 mg by mouth., Disp: , Rfl:     ALLERGIES:   Review of patient's allergies indicates:  No Known Allergies    Review of Systems   Constitutional:  Negative for chills, diaphoresis and fever.   HENT:  Negative for congestion and tinnitus.    Eyes:  Negative for blurred vision and double vision.   Respiratory:  Negative for cough and shortness of breath.    Cardiovascular:  Negative for chest pain and palpitations.   Gastrointestinal:  Negative for abdominal pain, nausea and vomiting.   Genitourinary:  Negative for dysuria and hematuria.   Musculoskeletal:  Positive for joint pain. Negative for falls.   Skin:  Negative for  itching and rash.   Neurological:  Negative for seizures and headaches.   Psychiatric/Behavioral:  Negative for depression. The patient does not have insomnia.          Objective:      There were no vitals filed for this visit.    PHYSICAL EXAM:  General: Alert & oriented x3, well-developed and well-nourished, in no acute distress, sitting comfortably in the exam room.  Skin: Warm and dry. Capillary refill less than 2 seconds.   Head: Normocephalic and atraumatic.   Eyes: Sclera appear normal.   Nose: No deformities seen.   Ears: No deformities seen.   Neck: No tracheal deviation present.   Pulmonary/Chest: Breathing unlabored.   Neurological: Alert and oriented to person, place, and time.   Psychiatric: Mood is pleasant and affect appropriate.     LEFT KNEE        Body habitus is normal.         The patient walks with a limp.        The skin over the knee is intact. No signs of infection.         Knee effusion:  none         Pulses DP present, PT present.        Motor:  normal 5/5 strength in all tested muscle groups.         Sensory:  normal.    Imaging:  None today.      Assessment:       1. Primary osteoarthritis of left knee        Plan:          I explained the nature of the problem to the patient.     I discussed at length with the patient all the different treatment options available for her left knee including anti-inflammatories, acetaminophen, rest, ice/heat, physical therapy to include strengthening exercise, weight loss, corticosteroid injections, viscosupplement injections, and Iovera.     I explained the potential role of knee replacement in the treatment of this condition. I also explained the typical clinical course.  The usual complications that that can occur were also discussed. The patient understands that if non-surgical measures do not adequately control symptoms, surgery will be considered in the future. The patient agrees to discuss this again at follow-up, if clinically  warranted.    Medications:  Continue OTC Tylenol and NSAIDs as needed.  Procedures:  Viscosupplement injection requested and performed today to the left knee with 20mg/2mL of Euflexxa. See procedure note. Standard precautions provided.   Pain Management: Ice compress to the affected area 2-3x a day for 15-20 minutes as needed for pain management.  Other:  Advised to avoid strenuous activities for the next 24-48 hours. After 48 hours, resume activities as tolerated.       Follow-Up:  1 week with Nallely Bravo PA-C for 3rd and final injection of the series.      All of the patient's questions were answered and the patient will contact us if they have any questions or concerns in the interim.      Nallely Bravo PA-C  [facility]  Orthopedic Surgery

## 2024-03-07 ENCOUNTER — TELEPHONE (OUTPATIENT)
Dept: ORTHOPEDICS | Facility: CLINIC | Age: 64
End: 2024-03-07
Payer: MEDICAID

## 2024-03-07 ENCOUNTER — OFFICE VISIT (OUTPATIENT)
Dept: ORTHOPEDICS | Facility: CLINIC | Age: 64
End: 2024-03-07
Payer: MEDICAID

## 2024-03-07 DIAGNOSIS — M17.12 PRIMARY OSTEOARTHRITIS OF LEFT KNEE: Primary | ICD-10-CM

## 2024-03-07 DIAGNOSIS — M70.62 GREATER TROCHANTERIC BURSITIS OF LEFT HIP: ICD-10-CM

## 2024-03-07 PROCEDURE — 1160F RVW MEDS BY RX/DR IN RCRD: CPT | Mod: CPTII,,,

## 2024-03-07 PROCEDURE — 99999 PR PBB SHADOW E&M-EST. PATIENT-LVL II: CPT | Mod: PBBFAC,,,

## 2024-03-07 PROCEDURE — 99999PBSHW PR PBB SHADOW TECHNICAL ONLY FILED TO HB: Mod: JZ,PBBFAC,,

## 2024-03-07 PROCEDURE — 99212 OFFICE O/P EST SF 10 MIN: CPT | Mod: PBBFAC,PN

## 2024-03-07 PROCEDURE — 1159F MED LIST DOCD IN RCRD: CPT | Mod: CPTII,,,

## 2024-03-07 PROCEDURE — 99213 OFFICE O/P EST LOW 20 MIN: CPT | Mod: S$PBB,25,,

## 2024-03-07 PROCEDURE — 20610 DRAIN/INJ JOINT/BURSA W/O US: CPT | Mod: PBBFAC,PN,LT

## 2024-03-07 PROCEDURE — 20610 DRAIN/INJ JOINT/BURSA W/O US: CPT | Mod: S$PBB,LT,,

## 2024-03-07 RX ADMIN — Medication 20 MG: at 10:03

## 2024-03-07 NOTE — PROGRESS NOTES
Subjective:      Patient ID: Enmanuel Dickson is a 63 y.o. female.    Chief Complaint: Pain of the Left Knee      HPI: Enmanuel Dickson is a 63 y.o. female who presents to clinic for third left knee Euflexxa injection. Patient tolerated previous injection well.    Patient is also reporting continued left hip pain. Patient was previously seen by Dr. العلي for chronic trochanteric bursitis of the left hip.  She was treated with an injection on 01/23/2024 and states the injection helped.  She is requesting repeat injection today.     Ambulating: unassisted    PAST MEDICAL HISTORY:    No past medical history on file.    MEDICATIONS:   Current Outpatient Medications:     acetaminophen (TYLENOL) 500 MG tablet, Take 2 tablets by mouth every 8 (eight) hours as needed., Disp: , Rfl:     biotin 1 mg tablet, Take 1,000 mcg by mouth., Disp: , Rfl:     gabapentin (NEURONTIN) 300 MG capsule, Take 300 mg by mouth 3 (three) times daily., Disp: , Rfl:     losartan-hydrochlorothiazide 50-12.5 mg (HYZAAR) 50-12.5 mg per tablet, Take 1 tablet by mouth every morning., Disp: , Rfl:     meloxicam (MOBIC) 15 MG tablet, Take 15 mg by mouth., Disp: , Rfl:     metoprolol succinate (TOPROL-XL) 50 MG 24 hr tablet, Take 50 mg by mouth., Disp: , Rfl:     ALLERGIES:   Review of patient's allergies indicates:  No Known Allergies    Review of Systems   Constitutional:  Negative for chills, diaphoresis and fever.   HENT:  Negative for congestion and tinnitus.    Eyes:  Negative for blurred vision and double vision.   Respiratory:  Negative for cough and shortness of breath.    Cardiovascular:  Negative for chest pain and palpitations.   Gastrointestinal:  Negative for abdominal pain, nausea and vomiting.   Genitourinary:  Negative for dysuria and hematuria.   Musculoskeletal:  Positive for joint pain. Negative for falls.   Skin:  Negative for itching and rash.   Neurological:  Negative for seizures and headaches.   Psychiatric/Behavioral:  Negative  for depression. The patient does not have insomnia.          Objective:      There were no vitals filed for this visit.    PHYSICAL EXAM:  General: Alert & oriented x3, well-developed and well-nourished, in no acute distress, sitting comfortably in the exam room.  Skin: Warm and dry. Capillary refill less than 2 seconds.   Head: Normocephalic and atraumatic.   Eyes: Sclera appear normal.   Nose: No deformities seen.   Ears: No deformities seen.   Neck: No tracheal deviation present.   Pulmonary/Chest: Breathing unlabored.   Neurological: Alert and oriented to person, place, and time.   Psychiatric: Mood is pleasant and affect appropriate.           Body habitus is normal.         The patient walks with a limp.    LEFT KNEE        The skin over the knee is intact. No signs of infection.         Knee effusion:  none         Pulses DP present, PT present.        Motor:  normal 5/5 strength in all tested muscle groups.         Sensory:  normal.      LEFT HIP:        Tenderness:   mild lateral tenderness .        Range of Motion:  Flexion -- full, External Rotation -- full, Internal Rotation -- full.        Resisted SLR:  negative.        Pain with rotation:  negative        Sciatic tension findings:  negative.        Shortening/lengthening compared to the contralateral side:  absent.        Motor:  normal 5/5 strength in all tested muscle groups.         Sensory:  normal.      Imaging:  None today.        Assessment:       1. Primary osteoarthritis of left knee    2. Greater trochanteric bursitis of left hip        Plan:          I explained the nature of the problem to the patient.     I discussed at length with the patient all the different treatment options available for her left knee and hip including anti-inflammatories, acetaminophen, rest, ice/heat, physical therapy to include strengthening exercise, weight loss, corticosteroid injections, viscosupplement injections, and Iovera. I explained the potential role of  knee replacement in the treatment of this condition. I also explained the typical clinical course.  The usual complications that that can occur were also discussed. The patient understands that if non-surgical measures do not adequately control symptoms, surgery will be considered in the future. The patient agrees to discuss this again at follow-up, if clinically warranted.    We discussed repeat injection for her left hip, however I explained it was too early to repeat this injection since it has been less than 3 months. Patient verbalized understanding.     Medications:  Continue OTC Tylenol and NSAIDs as needed.  Procedures:  Viscosupplement injection requested and performed today to the left knee with 20mg/2mL of Euflexxa. See procedure note. Standard precautions provided.   Pain Management: Ice compress to the affected area 2-3x a day for 15-20 minutes as needed for pain management.  Other:  Advised to avoid strenuous activities for the next 24-48 hours. After 48 hours, resume activities as tolerated.      Follow-Up:  2 months with Nallely Bravo PA-C for follow-up. Consider possible left hip greater trochanter bursa injection next visit.      All of the patient's questions were answered and the patient will contact us if they have any questions or concerns in the interim.    Nallely Bravo PA-C  [facility]  Orthopedic Surgery    Medical Dictation software was used during the dictation of portions or the entirety of this medical record.  Phonetic or grammatic errors may exist due to the use of this software. For clarification, refer to the author of the document.

## 2024-03-07 NOTE — LETTER
March 7, 2024      [facility] - Orthopedics  [address]  [address]  [place] [address]  Phone: [phone]  Fax: [phone]       Patient: Enmanuel Dickson   YOB: 1960  Date of Visit: 03/07/2024    To Whom It May Concern:    Annabelle Dickson  was at [facility] on 03/07/2024. The patient may return to work/school on 03/07/2024 with no restrictions. If you have any questions or concerns, or if I can be of further assistance, please do not hesitate to contact me.    Sincerely,    Jazzmine Mejia MA

## 2024-03-07 NOTE — PROCEDURES
Large Joint Aspiration/Injection: L knee    Date/Time: 3/7/2024 10:30 AM    Performed by: Nallely Bravo PA-C  Authorized by: Nallely Bravo PA-C    Consent Done?:  Yes (Verbal)  Indications:  Arthritis and pain  Site marked: the procedure site was marked    Timeout: prior to procedure the correct patient, procedure, and site was verified      Local anesthesia used?: Yes    Local anesthetic:  Topical anesthetic    Details:  Needle Size:  22 G  Ultrasonic Guidance for needle placement?: No    Approach:  Anterolateral  Location:  Knee  Site:  L knee  Medications:  20 mg sodium hyaluronate (EUFLEXXA) 10 mg/mL(mw 2.4 -3.6 million)  Patient tolerance:  Patient tolerated the procedure well with no immediate complications      Injection Procedure Note   Prior to procedure the correct patient, procedure, and site was verified. Allergies were reviewed and verbal consent was obtained. The procedure site was marked.    After time out was performed, the patient was prepped aseptically with chloraprep, the area was sprayed with local topical anesthetic, and then cleaned with alcohol. A therapeutic injection of 20mg/2mL of Euflexxa was given under sterile technique using a 22-gauge x 1.5 needle from the anterolateral aspect of the left knee joint. Sterile dressing applied.    Patient tolerated the procedure well. Pain decreased. She had no adverse reactions to the medication. She was reminded to call the clinic immediately for any adverse side effects as explained in clinic today.     Advised patient to avoid strenuous activities for the next 24-48 hours and to apply ice for 20 minutes to help alleviate this this pain.

## 2024-05-07 ENCOUNTER — OFFICE VISIT (OUTPATIENT)
Dept: ORTHOPEDICS | Facility: CLINIC | Age: 64
End: 2024-05-07
Payer: MEDICAID

## 2024-05-07 DIAGNOSIS — M17.12 PRIMARY OSTEOARTHRITIS OF LEFT KNEE: ICD-10-CM

## 2024-05-07 DIAGNOSIS — M70.62 GREATER TROCHANTERIC BURSITIS OF LEFT HIP: Primary | ICD-10-CM

## 2024-05-07 PROCEDURE — 1160F RVW MEDS BY RX/DR IN RCRD: CPT | Mod: CPTII,,,

## 2024-05-07 PROCEDURE — 99212 OFFICE O/P EST SF 10 MIN: CPT | Mod: PBBFAC,PN,25

## 2024-05-07 PROCEDURE — 20610 DRAIN/INJ JOINT/BURSA W/O US: CPT | Mod: PBBFAC,PN,LT

## 2024-05-07 PROCEDURE — 99999 PR PBB SHADOW E&M-EST. PATIENT-LVL II: CPT | Mod: PBBFAC,,,

## 2024-05-07 PROCEDURE — 1159F MED LIST DOCD IN RCRD: CPT | Mod: CPTII,,,

## 2024-05-07 PROCEDURE — 99999PBSHW PR PBB SHADOW TECHNICAL ONLY FILED TO HB: Mod: PBBFAC,,,

## 2024-05-07 PROCEDURE — 20610 DRAIN/INJ JOINT/BURSA W/O US: CPT | Mod: S$PBB,LT,,

## 2024-05-07 PROCEDURE — 99214 OFFICE O/P EST MOD 30 MIN: CPT | Mod: S$PBB,25,,

## 2024-05-07 RX ORDER — TRIAMCINOLONE ACETONIDE 40 MG/ML
40 INJECTION, SUSPENSION INTRA-ARTICULAR; INTRAMUSCULAR
Status: DISCONTINUED | OUTPATIENT
Start: 2024-05-07 | End: 2024-05-07 | Stop reason: HOSPADM

## 2024-05-07 RX ADMIN — TRIAMCINOLONE ACETONIDE 40 MG: 40 INJECTION, SUSPENSION INTRA-ARTICULAR; INTRAMUSCULAR at 01:05

## 2024-05-07 NOTE — PROGRESS NOTES
Subjective:      Patient ID: Enmanuel Dickson is a 63 y.o. female.    Chief Complaint: Pain of the Left Knee and Pain of the Left Hip      HPI: Enmanuel Dickson is a 63 y.o. female who presents to clinic for follow up of left knee osteoarthritis and chronic left hip trochanteric bursitis. Patient was recently treated with left knee viscosupplement injections (Euflexxa), with her last injection on 03/07/2024.  Patient states her left knee has been doing well following viscosupplement injections.  She reports some pain after a long day of work and walking, but states this is manageable with Tylenol Arthritis, ice, and rest.  Patient fit knee brace as needed with ambulation and when knee pain flares up.  Patient is interested in proceeding with total knee arthroplasty in the future, but patient would like to wait until she retires.  Patient states she has a meeting next week with social security to discuss senior care date. Patient also reports continued left hip pain. Patient was previously seen by Dr. العلي for chronic trochanteric bursitis of the left hip.  She was treated with an injection on 01/23/2024 and states the injection helped.  She is requesting repeat injection today.     Occupation:  .    Ambulating: unassisted  Diabetic:  No  Smoking:  She has never smoked.  History of DVT/PE: Negative    PAST MEDICAL HISTORY:    History reviewed. No pertinent past medical history.    MEDICATIONS:   Current Outpatient Medications:     acetaminophen (TYLENOL) 500 MG tablet, Take 2 tablets by mouth every 8 (eight) hours as needed., Disp: , Rfl:     biotin 1 mg tablet, Take 1,000 mcg by mouth., Disp: , Rfl:     gabapentin (NEURONTIN) 300 MG capsule, Take 300 mg by mouth 3 (three) times daily., Disp: , Rfl:     losartan-hydrochlorothiazide 50-12.5 mg (HYZAAR) 50-12.5 mg per tablet, Take 1 tablet by mouth every morning., Disp: , Rfl:     meloxicam (MOBIC) 15 MG tablet, Take 15 mg by mouth., Disp: , Rfl:      metoprolol succinate (TOPROL-XL) 50 MG 24 hr tablet, Take 50 mg by mouth., Disp: , Rfl:     ALLERGIES:   Review of patient's allergies indicates:  No Known Allergies    Review of Systems:  Constitution: Negative for chills, fever and night sweats.   HENT: Negative for congestion and headaches.    Eyes: Negative for blurred vision or vision loss.  Cardiovascular: Negative for chest pain and syncope.   Respiratory: Negative for cough and shortness of breath.    Endocrine: Negative for polydipsia, polyphagia and polyuria.   Hematologic/Lymphatic: Negative for bleeding problem. Does not bruise/bleed easily.   Skin: Negative for dry skin, itching and rash.   Musculoskeletal: See HPI.   Gastrointestinal: Negative for abdominal pain and bowel incontinence.   Genitourinary: Negative for bladder incontinence and nocturia.   Neurological: Negative for disturbances in coordination, loss of balance and seizures.   Psychiatric/Behavioral: Negative for depression. The patient does not have insomnia.    Allergic/Immunologic: Negative for hives and persistent infections.          Objective:      There were no vitals filed for this visit.    PHYSICAL EXAM:  General: Alert & oriented x3, well-developed and well-nourished, in no acute distress, sitting comfortably in the exam room.  Skin: Warm and dry. Capillary refill less than 2 seconds.   Head: Normocephalic and atraumatic.   Eyes: Sclera appear normal.   Nose: No deformities seen.   Ears: No deformities seen.   Neck: No tracheal deviation present.   Pulmonary/Chest: Breathing unlabored.   Neurological: Alert and oriented to person, place, and time.   Psychiatric: Mood is pleasant and affect appropriate.           Body habitus is normal.         The patient walks with a limp.    LEFT KNEE        Resisted SLR:  negative.         Hip irritability:  negative.         The skin over the knee is intact.        Knee effusion:  trace         Tenderness:  no local tenderness.        Range  of Motion:    Flexion:  full  Extension:  full        Ligament exam:  2+ valgus laxity        Patellar apprehension:  negative.        Popliteal cyst:  negative.        Patellar crepitation:  present.        Pulses DP present, PT present.        Motor:  normal 5/5 strength in all tested muscle groups.         Sensory:  normal.      LEFT HIP:.         Resisted SLR:  negative.        Sciatic tension findings:  negative.        The skin over the hip is intact.        Tenderness:  mild lateral tenderness.        Range of Motion:    Flexion:  full  External Rotation:  full  Internal Rotation:  full        Pain with rotation:  negative        Shortening/lengthening compared to the contralateral side:  exam deferred.        Pulses DP present, PT present.        Motor:  normal 5/5 strength in all tested muscle groups.         Sensory:  normal.    Imaging:  None today.      Assessment:       1. Greater trochanteric bursitis of left hip    2. Primary osteoarthritis of left knee        Plan:       Orders Placed This Encounter    Large Joint Aspiration/Injection: L greater trochanteric bursa     I explained the nature of the problem to the patient.     I discussed at length with the patient all the different treatment options available for her left knee including anti-inflammatories, acetaminophen, bracing, rest, ice, heat, physical therapy to include strengthening exercise, weight loss, corticosteroid injections, viscosupplement injections, and Iovera.     I explained the potential role of knee replacement in the treatment of this condition. I also explained the typical clinical course.  The usual complications that that can occur were also discussed. The patient understands that if non-surgical measures do not adequately control symptoms, surgery will be considered in the future. The patient agrees to discuss this again at follow-up, if clinically warranted.    Patient is retiring soon and would like to schedule total knee  arthroplasty for after she retires.     Medications:  Continue OTC Tylenol Arthritis and/or NSAIDs as needed for pain.    Procedures:  Corticosteroid injection requested and performed today to the left greater trochanter bursa. See procedure note. Standard precautions provided.  DME:  Continue use of copper fit brace as needed with ambulation and activities.  Pain Management: Ice compress to the affected area 2-3x a day for 15-20 minutes as needed for pain management.      Follow-Up:  3-4 months with Dr. العلي for follow-up to schedule left total knee arthroplasty.    All of the patient's questions were answered and the patient will contact us if they have any questions or concerns in the interim.    Nallely Bravo PA-C  [facility]  Orthopedic Surgery    Medical Dictation software was used during the dictation of portions or the entirety of this medical record.  Phonetic or grammatic errors may exist due to the use of this software. For clarification, refer to the author of the document.

## 2024-05-07 NOTE — PROCEDURES
Large Joint Aspiration/Injection: L greater trochanteric bursa    Date/Time: 5/7/2024 1:30 PM    Performed by: Nallely Bravo PA-C  Authorized by: Nallely Bravo PA-C    Consent Done?:  Yes (Verbal)  Indications:  Pain and arthritis  Site marked: the procedure site was marked    Timeout: prior to procedure the correct patient, procedure, and site was verified      Local anesthesia used?: Yes    Local anesthetic:  Topical anesthetic    Details:  Needle Size:  22 G  Ultrasonic Guidance for needle placement?: No    Approach:  Lateral  Location:  Hip  Site:  L greater trochanteric bursa  Medications:  40 mg triamcinolone acetonide 40 mg/mL  Patient tolerance:  Patient tolerated the procedure well with no immediate complications    Injection Procedure Note   Prior to procedure the correct patient, procedure, and site was verified. Allergies were reviewed and verbal consent was obtained. The procedure site was marked.    After time out was performed, the patient was prepped aseptically with chloraprep, the area was sprayed with local topical anesthetic, and then cleaned with alcohol. A therapeutic injection of combination of 2 cc 1% Xylocaine and 40mg Triamcinolone was given under sterile technique using a 22-gauge x 1.5 needle from the lateral aspect of the left hip. Sterile dressing applied.     Patient tolerated the procedure well. Pain decreased. She had no adverse reactions to the medication.     The patient is cautioned that immediate relief of pain is secondary to the local anesthetic and will be temporary. After the anesthetic wears off there may be a increase in pain that may last for a few hours or a few days. Advised patient to avoid strenuous activities for the next 24-48 hours and to apply ice for 20 minutes to help alleviate this this pain. She was warned of possible blood sugar and/or blood pressure changes following injection. She was reminded to call the clinic immediately for any adverse  side effects as explained in clinic today.

## 2024-09-30 ENCOUNTER — TELEPHONE (OUTPATIENT)
Dept: ORTHOPEDICS | Facility: CLINIC | Age: 64
End: 2024-09-30
Payer: MEDICAID

## 2024-09-30 NOTE — TELEPHONE ENCOUNTER
Called pt. Number not in service. Pt has an upcoming appointment with Dr Pang. Per provider she has to f/u w Dr. العلي to talk surgery. I canceled her injection with Nallely Bravo.

## 2024-10-08 ENCOUNTER — OFFICE VISIT (OUTPATIENT)
Dept: ORTHOPEDICS | Facility: CLINIC | Age: 64
End: 2024-10-08
Payer: MEDICARE

## 2024-10-08 DIAGNOSIS — M17.12 PRIMARY OSTEOARTHRITIS OF LEFT KNEE: Primary | ICD-10-CM

## 2024-10-08 DIAGNOSIS — M70.62 GREATER TROCHANTERIC BURSITIS OF LEFT HIP: ICD-10-CM

## 2024-10-08 DIAGNOSIS — Z96.652 S/P TOTAL KNEE ARTHROPLASTY, LEFT: ICD-10-CM

## 2024-10-08 PROCEDURE — 1159F MED LIST DOCD IN RCRD: CPT | Mod: CPTII,S$GLB,, | Performed by: ORTHOPAEDIC SURGERY

## 2024-10-08 PROCEDURE — 1101F PT FALLS ASSESS-DOCD LE1/YR: CPT | Mod: CPTII,S$GLB,, | Performed by: ORTHOPAEDIC SURGERY

## 2024-10-08 PROCEDURE — 1160F RVW MEDS BY RX/DR IN RCRD: CPT | Mod: CPTII,S$GLB,, | Performed by: ORTHOPAEDIC SURGERY

## 2024-10-08 PROCEDURE — 20610 DRAIN/INJ JOINT/BURSA W/O US: CPT | Mod: LT,S$GLB,, | Performed by: ORTHOPAEDIC SURGERY

## 2024-10-08 PROCEDURE — 99214 OFFICE O/P EST MOD 30 MIN: CPT | Mod: 25,S$GLB,, | Performed by: ORTHOPAEDIC SURGERY

## 2024-10-08 PROCEDURE — 3288F FALL RISK ASSESSMENT DOCD: CPT | Mod: CPTII,S$GLB,, | Performed by: ORTHOPAEDIC SURGERY

## 2024-10-08 PROCEDURE — 99999 PR PBB SHADOW E&M-EST. PATIENT-LVL III: CPT | Mod: PBBFAC,,, | Performed by: ORTHOPAEDIC SURGERY

## 2024-10-08 RX ORDER — TRIAMCINOLONE ACETONIDE 40 MG/ML
40 INJECTION, SUSPENSION INTRA-ARTICULAR; INTRAMUSCULAR
Status: DISCONTINUED | OUTPATIENT
Start: 2024-10-08 | End: 2024-10-08 | Stop reason: HOSPADM

## 2024-10-08 RX ADMIN — TRIAMCINOLONE ACETONIDE 40 MG: 40 INJECTION, SUSPENSION INTRA-ARTICULAR; INTRAMUSCULAR at 09:10

## 2024-10-08 NOTE — PROCEDURES
Large Joint Aspiration/Injection: L hip joint    Date/Time: 10/8/2024 9:00 AM    Performed by: Zach العلي MD  Authorized by: Zach العلي MD    Location:  Hip  Site:  L hip joint  Medications:  40 mg triamcinolone acetonide 40 mg/mL     After obtaining verbal informed consent the patient's left greater trochanteric bursa was aseptically injected with 40 mg of triamcinolone and 1 cc of 1% plain Xylocaine.  Appropriate management of postinjection flare was explained.

## 2024-10-08 NOTE — PROGRESS NOTES
Subjective:      Patient ID: Enmanuel Dickson is a 64 y.o. female.    Chief Complaint: Follow-up (Discuss left TKA )    HPI    Follow-up for osteoarthritis.  The patient states that her left knee is getting worse.  Mag at times and causes pain with prolonged ambulation.  Previous injection, arthroscopy and NSAIDs have not adequately control symptoms.          Review of Systems   Constitutional: Negative for fever and weight loss.   HENT:  Negative for congestion.    Eyes:  Negative for visual disturbance.   Cardiovascular:  Negative for chest pain.   Respiratory:  Negative for shortness of breath.    Hematologic/Lymphatic: Negative for bleeding problem. Does not bruise/bleed easily.   Skin:  Negative for poor wound healing.   Musculoskeletal:  Positive for joint pain.   Gastrointestinal:  Negative for abdominal pain.   Genitourinary:  Negative for dysuria.   Neurological:  Negative for seizures.   Psychiatric/Behavioral:  Negative for altered mental status.    Allergic/Immunologic: Negative for persistent infections.         Objective:      Ortho/SPM Exam      Left knee    [unfilled]    The patient is not in acute distress.   Sclerae normal  Body habitus is normal.  Respiratory distress:  none   The patient walks with a limp.  Hip irritability  negative.   The skin over the knee is intact.  Knee effusion 2+  Palpation- nontender  Range of motion- 0-120  deg,   Ligament laxity exam:  The +valgus laxity  Popliteal cyst positive  Patellar crepitation present.  Flexion/pinch negative  Pulses DP present, PT present.  Motor normal 5/5 strength in all tested muscle groups.   Sensory normal.    Previous left knee radiographs document Kellgren stage IV osteoarthritis of the left knee          Assessment:       Encounter Diagnosis   Name Primary?    Primary osteoarthritis of left knee Yes        The patient has advanced structural degeneration with significant pain and progression.  Nonsurgical measures are not controlling  symptoms.  The only intervention likely to provide long-term pain relief and improved mobility is arthroplasty          Plan:       Enmanuel was seen today for follow-up.    Diagnoses and all orders for this visit:    Primary osteoarthritis of left knee  -     X-ray Knee Ortho Bilateral with Flexion; Future          I explained my diagnostic impression and the reasoning behind it in detail, using layman's terms.  Models and/or pictures were used to help in the explanation.    Treatment options were discussed. The surgical process of left knee replacement was discussed in detail with the patient including a detailed discussion of the procedure itself (including visual model, x-ray review, and literature review).  I explained that patellar resurfacing is an optional part of the procedure, and that I would make an intraoperative decision as to whether or not it is necessary.  The typical perioperative and post-operative course was discussed and perioperative risks were discussed to the patient's satisfaction.  Risks and complications discussed included but were not limited to the risks of anesthetic complications, infection, bleeding, wound healing complications, stiffness, aseptic loosening, instability, limb length inequality, neurologic dysfunction including numbness and weakness, additional surgery,  DVT, pulmonary embolism, perioperative medical risks (cardiac, pulmonary, renal, neurologic), and death and the patient elects to proceed.        X-ray at preop visit      The patient also requested and gave consent for left trochanteric injection today.

## 2024-11-06 ENCOUNTER — TELEPHONE (OUTPATIENT)
Dept: ORTHOPEDICS | Facility: CLINIC | Age: 64
End: 2024-11-06
Payer: MEDICARE

## 2025-01-28 NOTE — PROGRESS NOTES
Subjective:       Patient ID: Enmanuel Dickson is a 64 y.o. female.    Chief Complaint: No chief complaint on file.      Emnanuel Dickson is a 64 y.o. female presents today for a patient optimization in preparation for a Left total knee arthroplasty to be performed by  Dr. العلي on 2/10/2025.  Enmanuel Dickson has a multiyear history of Left knee pain.  Pain is worse with activity and weight bearing. Patient has experienced interference of ADLs due to increased pain and decreased range of motion. Patient has failed non-operative treatment including NSAIDs, activity modification, and corticosteroid injections. There has been no significant change in medical status since last visit.      Ambulating: without assitance  Diabetic:  no  Smoking:  never  History of DVT/PE: no    PMHx significant for:  HTN  GERD      Past Medical History:   Diagnosis Date    GERD (gastroesophageal reflux disease)     Hypertension     takes meds about weekly not daily; bp runs low to normal    Primary osteoarthritis of left knee      Past Surgical History:   Procedure Laterality Date    COLONOSCOPY      x2    FRACTURE SURGERY Right     leg-cinthia-pins and screws placed    JOINT REPLACEMENT Right     knee    KNEE ARTHROSCOPY Left      No family history on file.  Social History     Socioeconomic History    Marital status: Single   Tobacco Use    Smoking status: Never    Smokeless tobacco: Never   Substance and Sexual Activity    Alcohol use: Yes     Alcohol/week: 12.0 - 15.0 standard drinks of alcohol     Types: 12 - 15 Cans of beer per week    Drug use: Never    Sexual activity: Not Currently       Current Outpatient Medications   Medication Sig Dispense Refill    acetaminophen (TYLENOL) 650 MG TbSR Take 1,300 mg by mouth as needed.      biotin 1 mg tablet Take 2,000 mcg by mouth twice a week. Instructed to hold for sx      losartan (COZAAR) 50 MG tablet Take 50 mg by mouth once a week.      meloxicam (MOBIC) 15 MG tablet Take 15 mg by mouth as  needed. Instructed last dose 2/2/25      metoprolol succinate (TOPROL-XL) 50 MG 24 hr tablet Take 50 mg by mouth once a week.      omeprazole (PRILOSEC) 40 MG capsule Take 40 mg by mouth as needed.       No current facility-administered medications for this visit.     Review of patient's allergies indicates:   Allergen Reactions    Adhesive Other (See Comments)     Open wound       Review of Systems   Constitutional:  Negative for chills, fatigue and fever.   HENT:  Negative for congestion, nosebleeds, rhinorrhea, sinus pressure, sinus pain, sneezing and sore throat.    Eyes:  Negative for pain and redness.   Respiratory:  Negative for cough, chest tightness, shortness of breath, wheezing and stridor.    Cardiovascular:  Negative for chest pain, palpitations and leg swelling.   Gastrointestinal:  Negative for blood in stool, constipation, diarrhea, nausea and vomiting.   Genitourinary:  Negative for difficulty urinating, dysuria and hematuria.   Musculoskeletal:  Positive for arthralgias, gait problem and joint swelling.   Skin:  Negative for color change, pallor, rash and wound.   Neurological:  Negative for dizziness, seizures, light-headedness and headaches.   Psychiatric/Behavioral: Negative.         Objective:      There were no vitals filed for this visit.  Physical Exam  Constitutional:       Appearance: Normal appearance. She is normal weight.   Eyes:      Extraocular Movements: Extraocular movements intact.      Conjunctiva/sclera: Conjunctivae normal.   Cardiovascular:      Rate and Rhythm: Normal rate and regular rhythm.      Heart sounds: Normal heart sounds. No murmur heard.     No friction rub. No gallop.   Pulmonary:      Effort: Pulmonary effort is normal. No respiratory distress.      Breath sounds: Normal breath sounds. No stridor. No wheezing, rhonchi or rales.   Skin:     General: Skin is warm and dry.      Capillary Refill: Capillary refill takes less than 2 seconds.      Coloration: Skin is not  pale.      Findings: No bruising, lesion or rash.   Neurological:      General: No focal deficit present.      Mental Status: She is alert and oriented to person, place, and time.   Psychiatric:         Mood and Affect: Mood normal.         Behavior: Behavior normal.         Thought Content: Thought content normal.         Judgment: Judgment normal.       The patient walks with a limp.  Hip irritability  negative.   The skin over the knee is intact.  Knee effusion 2+  Palpation- nontender  Range of motion- 0-120  deg,   Ligament laxity exam:  The +valgus laxity  Popliteal cyst positive  Patellar crepitation present.  Flexion/pinch negative  Pulses DP present, PT present.  Motor normal 5/5 strength in all tested muscle groups.   Sensory normal.     Previous left knee radiographs document Kellgren stage IV osteoarthritis of the left knee        Assessment:       1. Primary osteoarthritis of left knee    2. S/P total knee arthroplasty, left    3. Chronic pain of left knee    4. Preop examination        Plan:     Pt will have preop testing including CBC, CMP, EKG performed following today's appt    During today's Patient Optimization Visit all consultant notes, medications, imaging, EKG, and lab work has been reviewed. Discharge planning was discussed and Home Health has been ordered. Any additional testing or consults needed for medical clearance has been ordered. Pre, andrei, and post operative procedures and expectations discussed. All questions were answered.   Enmanuel Dickson will contact us if there are any questions, concerns, or changes in medical status prior to surgery.  The mobility limitation cannot be sufficiently resolved by the use of a cane. Patient's functional mobility deficit can be sufficiently resolved with the use of a (Rolling Walker or Walker). Patient's mobility limitation significantly impairs their ability to participate in one of more activities of daily living. The use of a (RW or Walker) will  significantly improve the patient's ability to participate in MRADLS and the patient will use it on regular basis in the home.     No orders of the defined types were placed in this encounter.        All of the patient's questions were answered and the patient will contact us if they have any questions or concerns in the interim.      Nicki Goel PA-C  [facility]  Orthopedic Surgery        45 minutes spend on this encounter. This includes face to face time and non-face to face time preparing to see the patient (eg, review of tests), obtaining and/or reviewing separately obtained history, documenting clinical information in the electronic or other health record, independently interpreting results and communicating results to the patient/family/caregiver, or care coordinator.

## 2025-01-29 DIAGNOSIS — M17.12 PRIMARY OSTEOARTHRITIS OF LEFT KNEE: Primary | ICD-10-CM

## 2025-01-30 ENCOUNTER — OFFICE VISIT (OUTPATIENT)
Dept: ORTHOPEDICS | Facility: CLINIC | Age: 65
End: 2025-01-30
Payer: MEDICARE

## 2025-01-30 VITALS — HEIGHT: 64 IN | BODY MASS INDEX: 22.33 KG/M2 | WEIGHT: 130.81 LBS

## 2025-01-30 DIAGNOSIS — Z01.818 PREOP EXAMINATION: ICD-10-CM

## 2025-01-30 DIAGNOSIS — M17.12 PRIMARY OSTEOARTHRITIS OF LEFT KNEE: Primary | ICD-10-CM

## 2025-01-30 DIAGNOSIS — G89.29 CHRONIC PAIN OF LEFT KNEE: ICD-10-CM

## 2025-01-30 DIAGNOSIS — Z96.652 S/P TOTAL KNEE ARTHROPLASTY, LEFT: ICD-10-CM

## 2025-01-30 DIAGNOSIS — M25.562 CHRONIC PAIN OF LEFT KNEE: ICD-10-CM

## 2025-01-30 PROCEDURE — 99999 PR PBB SHADOW E&M-EST. PATIENT-LVL III: CPT | Mod: PBBFAC,,,

## 2025-02-10 ENCOUNTER — TELEPHONE (OUTPATIENT)
Dept: ORTHOPEDICS | Facility: CLINIC | Age: 65
End: 2025-02-10
Payer: MEDICARE

## 2025-02-10 PROBLEM — Z96.652 HISTORY OF LEFT KNEE REPLACEMENT: Status: ACTIVE | Noted: 2025-02-10

## 2025-02-10 NOTE — TELEPHONE ENCOUNTER
----- Message from Neha sent at 2/10/2025 10:03 AM CST -----  Type:  Pharmacy Calling to Clarify an RX      Pharmacy Name:[facility]   Prescription Name:oxyCODONE-acetaminophen (PERCOCET) 5-325 mg per tablet  What do they need to clarify?:is this for clinic therapy?? if so pharmacy is requesting a PA  Best Call Back Number:[phone]  Additional Information:

## 2025-02-10 NOTE — TELEPHONE ENCOUNTER
Spoke with Pharmacist, okay to fill 7 day supply. Patient can notify MD of refill request if needed.

## 2025-02-25 ENCOUNTER — OFFICE VISIT (OUTPATIENT)
Dept: ORTHOPEDICS | Facility: CLINIC | Age: 65
End: 2025-02-25
Payer: MEDICARE

## 2025-02-25 DIAGNOSIS — M17.12 PRIMARY OSTEOARTHRITIS OF LEFT KNEE: Primary | ICD-10-CM

## 2025-02-25 DIAGNOSIS — M70.62 GREATER TROCHANTERIC BURSITIS OF LEFT HIP: ICD-10-CM

## 2025-02-25 DIAGNOSIS — Z96.652 S/P TOTAL KNEE ARTHROPLASTY, LEFT: ICD-10-CM

## 2025-02-25 PROCEDURE — 99999 PR PBB SHADOW E&M-EST. PATIENT-LVL III: CPT | Mod: PBBFAC,,, | Performed by: ORTHOPAEDIC SURGERY

## 2025-02-25 RX ORDER — TRIAMCINOLONE ACETONIDE 40 MG/ML
40 INJECTION, SUSPENSION INTRA-ARTICULAR; INTRAMUSCULAR
Status: DISCONTINUED | OUTPATIENT
Start: 2025-02-25 | End: 2025-02-25 | Stop reason: HOSPADM

## 2025-02-25 RX ADMIN — TRIAMCINOLONE ACETONIDE 40 MG: 40 INJECTION, SUSPENSION INTRA-ARTICULAR; INTRAMUSCULAR at 11:02

## 2025-02-25 NOTE — PROGRESS NOTES
Subjective:      Patient ID: Enmanuel Dickson is a 64 y.o. female.    Chief Complaint: Post-op Evaluation (2 WK P/O- LEFT TKA )      HPI:  Two weeks postop  The patient is seen for postop follow-up of left  TKA.  Pain control has been satisfactory  They feel that they are ambulating easily  Postoperative complaints include:  The patient requests reinjection of her left greater trochanteric bursitis    Current Medications[1]  Review of patient's allergies indicates:   Allergen Reactions    Adhesive Other (See Comments)     Open wound       There were no vitals taken for this visit.    ROS        Objective:    Ortho Exam          Alert, oriented, no acute distress  Sclera-normal   Respiratory distress-none  Gait minimal limp  Incision:  Healing cleanly  Range of motion:  5-95  Valgus/varus stability- stable  Swelling-mild  Distal perfusion-intact  Distal neurologic-intact    Imaging:  Left knee radiographs show well-positioned well-fixed total knee implant    Assessment:             1. Primary osteoarthritis of left knee    2. Greater trochanteric bursitis of left hip    3. S/P total knee arthroplasty, left        The patient is recovering as expected from the procedure.  There is no evidence of surgical site complication        Plan:          No follow-ups on file.    I explained my assessment, and I reviewed the natural history of recovery from this procedure.  Appropriate progression of physical activity, exercise and therapy was discussed.    The patient gave consent for reinjection of her chronic left trochanteric bursitis               [1]   Current Outpatient Medications:     acetaminophen (TYLENOL) 650 MG TbSR, Take 1,300 mg by mouth as needed., Disp: , Rfl:     aspirin (ECOTRIN) 81 MG EC tablet, Take 1 tablet (81 mg total) by mouth once daily., Disp: , Rfl:     biotin 1 mg tablet, Take 2,000 mcg by mouth twice a week. Instructed to hold for sx, Disp: , Rfl:     losartan (COZAAR) 50 MG tablet, Take 50 mg by  mouth once a week., Disp: , Rfl:     meloxicam (MOBIC) 15 MG tablet, Take 15 mg by mouth as needed. Instructed last dose 2/2/25, Disp: , Rfl:     metoprolol succinate (TOPROL-XL) 50 MG 24 hr tablet, Take 50 mg by mouth once a week., Disp: , Rfl:     omeprazole (PRILOSEC) 40 MG capsule, Take 40 mg by mouth as needed., Disp: , Rfl:     oxyCODONE-acetaminophen (PERCOCET) 5-325 mg per tablet, Take 1 tablet by mouth every 6 (six) hours as needed for Pain., Disp: 45 tablet, Rfl: 0

## 2025-02-25 NOTE — PROCEDURES
Large Joint Aspiration/Injection: L hip joint    Date/Time: 2/25/2025 11:15 AM    Performed by: Zach العلي MD  Authorized by: Zach العلي MD    Location:  Hip  Site:  L hip joint  Medications:  40 mg triamcinolone acetonide 40 mg/mL     After obtaining verbal informed consent the patient's left greater trochanteric bursa was aseptically injected with 40 mg of triamcinolone and 1 cc of 1% plain Xylocaine.  Appropriate management of postinjection flare was explained.

## 2025-02-26 PROBLEM — M25.662 DECREASED RANGE OF MOTION (ROM) OF LEFT KNEE: Status: ACTIVE | Noted: 2025-02-26

## 2025-02-26 PROBLEM — M62.81 WEAKNESS OF LEFT QUADRICEPS MUSCLE: Status: ACTIVE | Noted: 2025-02-26

## 2025-02-27 ENCOUNTER — TELEPHONE (OUTPATIENT)
Dept: ORTHOPEDICS | Facility: CLINIC | Age: 65
End: 2025-02-27
Payer: MEDICARE

## 2025-02-27 NOTE — TELEPHONE ENCOUNTER
----- Message from Nasir sent at 2/27/2025 12:55 PM CST -----  Regarding: pt  Name of Who is Calling:Pt What is the request in detail: Requesting if she can bath now after 3 week Post Op Please advise Can the clinic reply by [facility]: no What [facility]:Telephone Information:Mobile          [phone]

## 2025-04-01 ENCOUNTER — OFFICE VISIT (OUTPATIENT)
Dept: ORTHOPEDICS | Facility: CLINIC | Age: 65
End: 2025-04-01
Payer: MEDICARE

## 2025-04-01 DIAGNOSIS — M17.12 PRIMARY OSTEOARTHRITIS OF LEFT KNEE: Primary | ICD-10-CM

## 2025-04-01 DIAGNOSIS — Z96.652 S/P TOTAL KNEE ARTHROPLASTY, LEFT: ICD-10-CM

## 2025-04-01 PROCEDURE — 1160F RVW MEDS BY RX/DR IN RCRD: CPT | Mod: CPTII,S$GLB,, | Performed by: ORTHOPAEDIC SURGERY

## 2025-04-01 PROCEDURE — 1159F MED LIST DOCD IN RCRD: CPT | Mod: CPTII,S$GLB,, | Performed by: ORTHOPAEDIC SURGERY

## 2025-04-01 PROCEDURE — 99999 PR PBB SHADOW E&M-EST. PATIENT-LVL II: CPT | Mod: PBBFAC,,, | Performed by: ORTHOPAEDIC SURGERY

## 2025-04-01 PROCEDURE — 99024 POSTOP FOLLOW-UP VISIT: CPT | Mod: S$GLB,,, | Performed by: ORTHOPAEDIC SURGERY

## 2025-04-01 NOTE — PROGRESS NOTES
Subjective:      Patient ID: Enmanuel Dickson is a 64 y.o. female.    Chief Complaint: Post-op Evaluation (LEFT TKA )      HPI:  Almost 2 months postop  The patient is seen for postop follow-up of left  TKA.  Pain control has been satisfactory  They feel that they are ambulating easily  Postoperative complaints include:  None at this time    Current Medications[1]  Review of patient's allergies indicates:   Allergen Reactions    Adhesive Other (See Comments)     Open wound       There were no vitals taken for this visit.    ROS        Objective:    Ortho Exam          Alert, oriented, no acute distress  Sclera-Normal  Respiratory distress-none  Gait minimal limp without cane  Incision:  Cleanly healed  Range of motion:  0-100  Valgus/varus stability- stable  Swelling-minimal  Distal perfusion-intact  Distal neurologic-intact    Imaging:  Postop left knee radiographs show well-positioned well-fixed left total knee implant without complicating process    Assessment:             No diagnosis found.    The patient is recovering as expected from the procedure.  There is no evidence of surgical site complication        Plan:          Follow up in about 3 months (around 7/1/2025).    I explained the natural history of recovery from the procedure.  Appropriate progression of general activity was reviewed               [1]   Current Outpatient Medications:     acetaminophen (TYLENOL) 650 MG TbSR, Take 1,300 mg by mouth as needed., Disp: , Rfl:     biotin 1 mg tablet, Take 2,000 mcg by mouth twice a week. Instructed to hold for sx, Disp: , Rfl:     losartan (COZAAR) 50 MG tablet, Take 50 mg by mouth once a week., Disp: , Rfl:     meloxicam (MOBIC) 15 MG tablet, Take 15 mg by mouth as needed. Instructed last dose 2/2/25, Disp: , Rfl:     metoprolol succinate (TOPROL-XL) 50 MG 24 hr tablet, Take 50 mg by mouth once a week., Disp: , Rfl:     omeprazole (PRILOSEC) 40 MG capsule, Take 40 mg by mouth as needed., Disp: , Rfl:

## 2025-04-05 ENCOUNTER — EXTERNAL HOME HEALTH (OUTPATIENT)
Dept: HOME HEALTH SERVICES | Facility: HOSPITAL | Age: 65
End: 2025-04-05
Payer: MEDICARE

## 2025-04-08 ENCOUNTER — TELEPHONE (OUTPATIENT)
Dept: ORTHOPEDICS | Facility: CLINIC | Age: 65
End: 2025-04-08
Payer: MEDICARE

## 2025-04-08 NOTE — TELEPHONE ENCOUNTER
Pt requested a new post op appointment. Called patient with date / time for upcoming appointment. Pt understood new date / time. Phone call ended.

## 2025-05-19 ENCOUNTER — TELEPHONE (OUTPATIENT)
Dept: ORTHOPEDICS | Facility: CLINIC | Age: 65
End: 2025-05-19

## 2025-05-19 ENCOUNTER — OFFICE VISIT (OUTPATIENT)
Dept: ORTHOPEDICS | Facility: CLINIC | Age: 65
End: 2025-05-19
Payer: MEDICARE

## 2025-05-19 DIAGNOSIS — M17.12 PRIMARY OSTEOARTHRITIS OF LEFT KNEE: ICD-10-CM

## 2025-05-19 DIAGNOSIS — Z96.652 S/P TOTAL KNEE ARTHROPLASTY, LEFT: Primary | ICD-10-CM

## 2025-05-19 PROCEDURE — 1101F PT FALLS ASSESS-DOCD LE1/YR: CPT | Mod: CPTII,S$GLB,,

## 2025-05-19 PROCEDURE — 3288F FALL RISK ASSESSMENT DOCD: CPT | Mod: CPTII,S$GLB,,

## 2025-05-19 PROCEDURE — 99999 PR PBB SHADOW E&M-EST. PATIENT-LVL III: CPT | Mod: PBBFAC,,,

## 2025-05-19 PROCEDURE — 99024 POSTOP FOLLOW-UP VISIT: CPT | Mod: S$GLB,,,

## 2025-05-19 PROCEDURE — 1160F RVW MEDS BY RX/DR IN RCRD: CPT | Mod: CPTII,S$GLB,,

## 2025-05-19 PROCEDURE — 1126F AMNT PAIN NOTED NONE PRSNT: CPT | Mod: CPTII,S$GLB,,

## 2025-05-19 PROCEDURE — 1159F MED LIST DOCD IN RCRD: CPT | Mod: CPTII,S$GLB,,

## 2025-05-19 RX ORDER — MELOXICAM 15 MG/1
15 TABLET ORAL
Qty: 30 TABLET | Refills: 1 | Status: SHIPPED | OUTPATIENT
Start: 2025-05-19

## 2025-05-19 NOTE — PROGRESS NOTES
Subjective:      Patient ID: Enmanuel Dickson is a 64 y.o. female.    Chief Complaint: Post-op Follow Up    HPI: Enmanuel Dickson returns for a 3 month post-op visit following: left total knee arthroplasty (date of surgery 02/10/2025) with Dr. العلي. Overall, the patient is doing well with no complaints of fever, chills, nausea, vomiting, SOB, chest pains, or drainage to surgical incision. The patient reports that pain control has been satisfactory. She has been attending formal physical therapy and has been progressing with this. The patient reports ambulating easily. Post-operative complaints include: swelling.     PAST MEDICAL HISTORY:    Past Medical History:   Diagnosis Date    GERD (gastroesophageal reflux disease)     Hypertension     takes meds about weekly not daily; bp runs low to normal    Primary osteoarthritis of left knee      MEDICATIONS: Current Medications[1]  ALLERGIES:   Review of patient's allergies indicates:   Allergen Reactions    Adhesive Other (See Comments)     Open wound       Review of Systems:  Constitution: Negative for chills, fever and night sweats.   HENT: Negative for congestion and headaches.    Eyes: Negative for blurred vision or vision loss.  Cardiovascular: Negative for chest pain and syncope.   Respiratory: Negative for cough and shortness of breath.    Endocrine: Negative for polydipsia, polyphagia and polyuria.   Hematologic/Lymphatic: Negative for bleeding problem. Does not bruise/bleed easily.   Skin: Negative for dry skin, itching and rash.   Musculoskeletal: See HPI.   Gastrointestinal: Negative for abdominal pain and bowel incontinence.   Genitourinary: Negative for bladder incontinence and nocturia.   Neurological: Negative for disturbances in coordination, loss of balance and seizures.   Psychiatric/Behavioral: Negative for depression. The patient does not have insomnia.    Allergic/Immunologic: Negative for hives and persistent infections.        Objective:      There  were no vitals filed for this visit.    PHYSICAL EXAM:  General: Alert & oriented x3, well-developed and well-nourished, in no acute distress, sitting comfortably in the exam room.  Skin: Warm and dry. Capillary refill less than 2 seconds.   Head: Normocephalic and atraumatic.   Eyes: Sclera appear normal.   Nose: No deformities seen.   Ears: No deformities seen.   Neck: No tracheal deviation present.   Pulmonary/Chest: Breathing unlabored.   Neurological: Alert and oriented to person, place, and time.   Psychiatric: Mood is pleasant and affect appropriate.     LEFT KNEE:        Observation/Inspection:    Surgical incision is well healed with appropriate scar formation.  No redness, warmth, drainage, or other signs of infection.  Mild swelling.         Range of Motion:  Active Flexion:  105°  Passive Flexion:  105°  Active Extension:  0°  Passive Extension:  0°  Varus/Valgus stability:  stable.        Palpation:   Negative tenderness to palpation to bony prominences and soft tissues throughout.        Strength:    Strength not tested today.  Motor:  no muscle wasting or atrophy.         Neurovascular Exam:  Pulses DP present, PT present.  Sensory:  normal.      Imaging:    X-ray: Knee bilateral, independently reviewed, show: well-positioned well-fixed bilateral knee implants with no evidence of hardware failure. No acute fractures or dislocations.           Assessment:       1. S/P total knee arthroplasty, left    2. Primary osteoarthritis of left knee        Plan:          3 months s/p left total knee arthroplasty    Overall patient is doing well post-operatively.    Wound Care:  Continue with regular wound care.  Okay to wash incision with soap and water and pat to dry.   Medications:  Prescription provided today for meloxicam 15 mg for PRN pain management.  Patient understands to take with food and/or OTC prilosec to decrease GI side effects. Advised patient to refrain from taking other anti-inflammatory  medications while taking this medication, however she may alternate with acetaminophen as needed.    Antibiotics:  None prescribed today.  No evidence of wound infection.  Physical Therapy:  Continue with physical therapy for knee ROM, stretching, strengthening, and conditioning.  Activity:  Continue to advance activities as tolerated.  Pain Management: Ice compress to the affected area 2-3x a day for 15-20 minutes as needed for pain management.  Discussion:  Discussed with the patient that swelling of the knee is normal and expected.  This will continue to get better with time.  Also reassured patient that her range of motion looks good and she will continue to progress.      Follow-Up:  3 months for six-month postop visit    All of the patient's questions were answered and the patient will contact us if they have any questions or concerns in the interim.      Juany Maldonado PA-C  [facility]  Orthopedic Surgery    Medical Dictation software was used during the dictation of portions or the entirety of this medical record.  Phonetic or grammatic errors may exist due to the use of this software. For clarification, refer to the author of the document.             [1]   Current Outpatient Medications:     acetaminophen (TYLENOL) 650 MG TbSR, Take 1,300 mg by mouth as needed., Disp: , Rfl:     biotin 1 mg tablet, Take 2,000 mcg by mouth twice a week. Instructed to hold for sx, Disp: , Rfl:     losartan (COZAAR) 50 MG tablet, Take 50 mg by mouth once a week., Disp: , Rfl:     meloxicam (MOBIC) 15 MG tablet, Take 15 mg by mouth as needed. Instructed last dose 2/2/25, Disp: , Rfl:     metoprolol succinate (TOPROL-XL) 50 MG 24 hr tablet, Take 50 mg by mouth once a week., Disp: , Rfl:     omeprazole (PRILOSEC) 40 MG capsule, Take 40 mg by mouth as needed., Disp: , Rfl:

## 2025-08-06 ENCOUNTER — OFFICE VISIT (OUTPATIENT)
Dept: ORTHOPEDICS | Facility: CLINIC | Age: 65
End: 2025-08-06
Payer: MEDICARE

## 2025-08-06 DIAGNOSIS — Z96.652 S/P TOTAL KNEE ARTHROPLASTY, LEFT: ICD-10-CM

## 2025-08-06 DIAGNOSIS — M70.62 GREATER TROCHANTERIC BURSITIS OF LEFT HIP: Primary | ICD-10-CM

## 2025-08-06 PROCEDURE — 1160F RVW MEDS BY RX/DR IN RCRD: CPT | Mod: CPTII,S$GLB,,

## 2025-08-06 PROCEDURE — 99214 OFFICE O/P EST MOD 30 MIN: CPT | Mod: 25,S$GLB,,

## 2025-08-06 PROCEDURE — 1159F MED LIST DOCD IN RCRD: CPT | Mod: CPTII,S$GLB,,

## 2025-08-06 PROCEDURE — 4010F ACE/ARB THERAPY RXD/TAKEN: CPT | Mod: CPTII,S$GLB,,

## 2025-08-06 PROCEDURE — 1101F PT FALLS ASSESS-DOCD LE1/YR: CPT | Mod: CPTII,S$GLB,,

## 2025-08-06 PROCEDURE — 20610 DRAIN/INJ JOINT/BURSA W/O US: CPT | Mod: LT,S$GLB,,

## 2025-08-06 PROCEDURE — 99999 PR PBB SHADOW E&M-EST. PATIENT-LVL II: CPT | Mod: PBBFAC,,,

## 2025-08-06 PROCEDURE — 3288F FALL RISK ASSESSMENT DOCD: CPT | Mod: CPTII,S$GLB,,

## 2025-08-06 PROCEDURE — 1125F AMNT PAIN NOTED PAIN PRSNT: CPT | Mod: CPTII,S$GLB,,

## 2025-08-06 RX ORDER — TRIAMCINOLONE ACETONIDE 40 MG/ML
40 INJECTION, SUSPENSION INTRA-ARTICULAR; INTRAMUSCULAR
Status: DISCONTINUED | OUTPATIENT
Start: 2025-08-06 | End: 2025-08-06 | Stop reason: HOSPADM

## 2025-08-06 RX ADMIN — TRIAMCINOLONE ACETONIDE 40 MG: 40 INJECTION, SUSPENSION INTRA-ARTICULAR; INTRAMUSCULAR at 11:08

## 2025-08-06 NOTE — PROCEDURES
Large Joint Aspiration/Injection: L greater trochanteric bursa    Date/Time: 8/6/2025 11:00 AM    Performed by: Linh Maldonado PA-C  Authorized by: Linh Maldonado PA-C    Site marked: the procedure site was marked    Timeout: prior to procedure the correct patient, procedure, and site was verified    Prep: patient was prepped and draped in usual sterile fashion      Local anesthesia used?: Yes    Local anesthetic:  Topical anesthetic    Details:  Needle Size:  22 G  Ultrasonic Guidance for needle placement?: No    Approach:  Lateral  Location:  Hip  Site:  L greater trochanteric bursa  Medications:  40 mg triamcinolone acetonide 40 mg/mL  Patient tolerance:  Patient tolerated the procedure well with no immediate complications    Injection Procedure Note   Prior to procedure the correct patient, procedure, and site was verified. Allergies were reviewed and verbal consent was obtained. The procedure site was marked.    After time out was performed, the patient was prepped aseptically with chloraprep, the area was sprayed with local topical anesthetic, and then cleaned with alcohol. A therapeutic injection of combination of 2 cc 1% Xylocaine and 40mg Triamcinolone was given under sterile technique using a 22-gauge x 1.5 needle from the lateral aspect of the left hip. Sterile dressing applied.     Patient tolerated the procedure well. Pain decreased. She had no adverse reactions to the medication.     The patient is cautioned that immediate relief of pain is secondary to the local anesthetic and will be temporary. After the anesthetic wears off there may be a increase in pain that may last for a few hours or a few days. Advised patient to avoid strenuous activities for the next 24-48 hours and to apply ice for 20 minutes to help alleviate this this pain. She was warned of possible blood sugar and/or blood pressure changes following injection. She was reminded to call the clinic immediately for any  adverse side effects as explained in clinic today.

## 2025-08-06 NOTE — PROGRESS NOTES
Subjective:      Patient ID: Enmanuel Dickson is a 64 y.o. female.    Chief Complaint:  Follow up    HPI: Enmanuel Dickson returns for a 6 month post-op visit following: left total knee arthroplasty (date of surgery 02/10/2025) with Dr. العلي. Overall, the patient is doing well with no complaints of fever, chills, nausea, vomiting, SOB, chest pains, or drainage to surgical incision. The patient reports that pain control has been satisfactory. She has been progressing with her physical therapy exercises. The patient reports ambulating easily.   She is requesting repeat corticosteroid injection to left greater trochanteric bursa.  Last injection on 02/25/2025.    PAST MEDICAL HISTORY:    Past Medical History:   Diagnosis Date    GERD (gastroesophageal reflux disease)     Hypertension     takes meds about weekly not daily; bp runs low to normal    Primary osteoarthritis of left knee      MEDICATIONS: Current Medications[1]  ALLERGIES:   Review of patient's allergies indicates:   Allergen Reactions    Adhesive Other (See Comments)     Open wound       Review of Systems:  Constitution: Negative for chills, fever and night sweats.   HENT: Negative for congestion and headaches.    Eyes: Negative for blurred vision or vision loss.  Cardiovascular: Negative for chest pain and syncope.   Respiratory: Negative for cough and shortness of breath.    Endocrine: Negative for polydipsia, polyphagia and polyuria.   Hematologic/Lymphatic: Negative for bleeding problem. Does not bruise/bleed easily.   Skin: Negative for dry skin, itching and rash.   Musculoskeletal: See HPI.   Gastrointestinal: Negative for abdominal pain and bowel incontinence.   Genitourinary: Negative for bladder incontinence and nocturia.   Neurological: Negative for disturbances in coordination, loss of balance and seizures.   Psychiatric/Behavioral: Negative for depression. The patient does not have insomnia.    Allergic/Immunologic: Negative for hives and  persistent infections.        Objective:      There were no vitals filed for this visit.    PHYSICAL EXAM:  General: Alert & oriented x3, well-developed and well-nourished, in no acute distress, sitting comfortably in the exam room.  Skin: Warm and dry. Capillary refill less than 2 seconds.   Head: Normocephalic and atraumatic.   Eyes: Sclera appear normal.   Nose: No deformities seen.   Ears: No deformities seen.   Neck: No tracheal deviation present.   Pulmonary/Chest: Breathing unlabored.   Neurological: Alert and oriented to person, place, and time.   Psychiatric: Mood is pleasant and affect appropriate.     LEFT KNEE:        Observation/Inspection:    Surgical incision is well healed with appropriate scar formation.  No redness, warmth, drainage, or other signs of infection.  Mild swelling.         Range of Motion:  Active Flexion:  110°  Active Extension:  0°  Varus/Valgus stability:  stable.        Palpation:   Negative tenderness to palpation to bony prominences and soft tissues throughout.        Strength:    Strength not tested today.  Motor:  no muscle wasting or atrophy.         Neurovascular Exam:  Pulses DP present, PT present.  Sensory:  normal.      Imaging:    X-ray: Knee bilateral, independently reviewed, show: well-positioned well-fixed bilateral knee implants with no evidence of hardware failure. No acute fractures or dislocations.           Assessment:       1. Greater trochanteric bursitis of left hip    2. S/P total knee arthroplasty, left          Plan:       Orders Placed This Encounter    Large Joint Aspiration/Injection: L greater trochanteric bursa     6 months s/p left total knee arthroplasty  Chronic greater trochanteric bursitis of left hip      Medications:  Continue OTC Tylenol arthritis and meloxicam 15 mg as needed for pain management.    Physical Therapy:  Continue with physical therapy exercises.  Activity:  Continue to advance activities as tolerated.  Procedures:   Corticosteroid injection requested and performed today to the left greater trochanteric bursa. See procedure note. Standard precautions provided.   Pain Management: Ice compress to the affected area 2-3x a day for 15-20 minutes as needed for pain management.        Follow-Up:  Six months for 1 year postop follow up with Dr. العلي    All of the patient's questions were answered and the patient will contact us if they have any questions or concerns in the interim.        Juany Maldonado PA-C  [facility]  Orthopedic Surgery    Medical Dictation software was used during the dictation of portions or the entirety of this medical record.  Phonetic or grammatic errors may exist due to the use of this software. For clarification, refer to the author of the document.               [1]   Current Outpatient Medications:     acetaminophen (TYLENOL) 650 MG TbSR, Take 1,300 mg by mouth as needed., Disp: , Rfl:     biotin 1 mg tablet, Take 2,000 mcg by mouth twice a week. Instructed to hold for sx, Disp: , Rfl:     losartan (COZAAR) 50 MG tablet, Take 50 mg by mouth once a week., Disp: , Rfl:     meloxicam (MOBIC) 15 MG tablet, Take 1 tablet (15 mg total) by mouth as needed for Pain. Instructed last dose 2/2/25, Disp: 30 tablet, Rfl: 1    metoprolol succinate (TOPROL-XL) 50 MG 24 hr tablet, Take 50 mg by mouth once a week., Disp: , Rfl:     omeprazole (PRILOSEC) 40 MG capsule, Take 40 mg by mouth as needed., Disp: , Rfl: